# Patient Record
Sex: FEMALE | Race: WHITE | NOT HISPANIC OR LATINO
[De-identification: names, ages, dates, MRNs, and addresses within clinical notes are randomized per-mention and may not be internally consistent; named-entity substitution may affect disease eponyms.]

---

## 2017-09-04 ENCOUNTER — TRANSCRIPTION ENCOUNTER (OUTPATIENT)
Age: 37
End: 2017-09-04

## 2017-09-28 ENCOUNTER — APPOINTMENT (OUTPATIENT)
Dept: HUMAN REPRODUCTION | Facility: CLINIC | Age: 37
End: 2017-09-28

## 2019-08-12 ENCOUNTER — FORM ENCOUNTER (OUTPATIENT)
Age: 39
End: 2019-08-12

## 2020-05-08 ENCOUNTER — MESSAGE (OUTPATIENT)
Age: 40
End: 2020-05-08

## 2020-05-08 ENCOUNTER — APPOINTMENT (OUTPATIENT)
Dept: DISASTER EMERGENCY | Facility: CLINIC | Age: 40
End: 2020-05-08

## 2020-05-19 ENCOUNTER — APPOINTMENT (OUTPATIENT)
Dept: DISASTER EMERGENCY | Facility: CLINIC | Age: 40
End: 2020-05-19

## 2020-05-20 LAB
SARS-COV-2 IGG SERPL IA-ACNC: 3.2 INDEX
SARS-COV-2 IGG SERPL QL IA: POSITIVE

## 2020-05-29 ENCOUNTER — TRANSCRIPTION ENCOUNTER (OUTPATIENT)
Age: 40
End: 2020-05-29

## 2020-10-01 ENCOUNTER — APPOINTMENT (OUTPATIENT)
Dept: DERMATOLOGY | Facility: CLINIC | Age: 40
End: 2020-10-01
Payer: COMMERCIAL

## 2020-10-01 DIAGNOSIS — Z80.8 FAMILY HISTORY OF MALIGNANT NEOPLASM OF OTHER ORGANS OR SYSTEMS: ICD-10-CM

## 2020-10-01 PROCEDURE — 99203 OFFICE O/P NEW LOW 30 MIN: CPT

## 2020-10-01 RX ORDER — HYDROCORTISONE 1 %
12 CREAM (GRAM) TOPICAL
Qty: 1 | Refills: 3 | Status: ACTIVE | COMMUNITY
Start: 2020-10-01 | End: 1900-01-01

## 2020-10-01 RX ORDER — FLUOXETINE HYDROCHLORIDE 40 MG/1
CAPSULE ORAL
Refills: 0 | Status: ACTIVE | COMMUNITY

## 2020-10-01 NOTE — PHYSICAL EXAM
[Alert] : alert [Oriented x 3] : ~L oriented x 3 [Well Nourished] : well nourished [FreeTextEntry3] : The following areas were examined and no significant abnormalities were seen except as noted below:\par \par Type II skin\par \par scalp, face, eyelids, nose, lips, ears, neck, chest, abdomen, back, buttocks, right arm, left arm, right hand, left hand,\par right  leg, left leg, right foot, left foot\par Breast and groin exams offered and declined by patient.\par \par Right angle of jaw: 5 x 5 mm tan verrucous papule\par Multiple 2 mm tan macules present in a photo distribution\par Lower mid chest and under breasts: Symmetric reticulated tan slightly verrucous plaques\par \par No suspicious lesions seen

## 2020-10-01 NOTE — HISTORY OF PRESENT ILLNESS
[FreeTextEntry1] : Evaluation of growths [de-identified] : First visit for 39-year-old white female presenting for evaluation of growths. Particularly concerned about a lesion in the right angle of the jaw. No history of skin cancer.

## 2020-10-01 NOTE — ASSESSMENT
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

## 2020-10-27 ENCOUNTER — APPOINTMENT (OUTPATIENT)
Dept: FAMILY MEDICINE | Facility: CLINIC | Age: 40
End: 2020-10-27

## 2022-06-28 ENCOUNTER — APPOINTMENT (OUTPATIENT)
Dept: DERMATOLOGY | Facility: CLINIC | Age: 42
End: 2022-06-28
Payer: COMMERCIAL

## 2022-06-28 PROCEDURE — 99213 OFFICE O/P EST LOW 20 MIN: CPT

## 2022-06-28 RX ORDER — AZITHROMYCIN 250 MG/1
250 TABLET, FILM COATED ORAL
Qty: 6 | Refills: 0 | Status: DISCONTINUED | COMMUNITY
Start: 2022-03-25

## 2022-06-28 RX ORDER — CEFUROXIME AXETIL 250 MG/1
250 TABLET ORAL
Qty: 14 | Refills: 0 | Status: DISCONTINUED | COMMUNITY
Start: 2022-05-21

## 2022-06-28 RX ORDER — FLUTICASONE PROPIONATE 0.5 MG/G
0.05 CREAM TOPICAL
Qty: 1 | Refills: 2 | Status: ACTIVE | COMMUNITY
Start: 2022-06-28 | End: 1900-01-01

## 2022-06-28 RX ORDER — PREDNISONE 10 MG/1
10 TABLET ORAL
Qty: 18 | Refills: 0 | Status: DISCONTINUED | COMMUNITY
Start: 2022-06-11

## 2022-06-28 RX ORDER — AMOXICILLIN AND CLAVULANATE POTASSIUM 875; 125 MG/1; MG/1
875-125 TABLET, COATED ORAL
Qty: 20 | Refills: 0 | Status: DISCONTINUED | COMMUNITY
Start: 2022-06-11

## 2022-06-28 RX ORDER — NIRMATRELVIR AND RITONAVIR 300-100 MG
20 X 150 MG & KIT ORAL
Qty: 30 | Refills: 0 | Status: DISCONTINUED | COMMUNITY
Start: 2022-06-22

## 2022-06-28 NOTE — HISTORY OF PRESENT ILLNESS
[FreeTextEntry1] : Evaluation of growths [de-identified] : Followup visit for 41-year-old white female last seen by me on October 1, 2020, presenting for evaluation of growths.  Also complains of a rash in the lower abdominal pannus area present for a few days. Self treated with Desitin.\par \par Note: Patient recently took a course of Paxlovid for COVID-19.

## 2022-06-28 NOTE — PHYSICAL EXAM
[Alert] : alert [Oriented x 3] : ~L oriented x 3 [Well Nourished] : well nourished [FreeTextEntry3] : The following areas were examined and no significant abnormalities were seen except as noted below:\par \par Type II skin\par \par scalp, face, eyelids, nose, lips, ears, neck, chest, abdomen, back, buttocks, right arm, left arm, right hand, left hand,\par right  leg, left leg, right foot, left foot\par Breast and groin exams offered and declined by patient.\par \par Symmetric large erythematous/hyperpigmented patches present in the lower abdominal fold extending into the inguinal folds area - mild scaling present on the upper portion of the right lower abdominal fold rash\par KOH - negative\par \par \par No suspicious lesion seen

## 2022-07-27 ENCOUNTER — APPOINTMENT (OUTPATIENT)
Dept: DERMATOLOGY | Facility: CLINIC | Age: 42
End: 2022-07-27

## 2022-07-27 PROCEDURE — 10060 I&D ABSCESS SIMPLE/SINGLE: CPT

## 2022-07-27 PROCEDURE — 99212 OFFICE O/P EST SF 10 MIN: CPT | Mod: 25

## 2022-07-27 NOTE — HISTORY OF PRESENT ILLNESS
[FreeTextEntry1] : Rash on abdomen [de-identified] : Followup visit for 41-year-old white female last seen by me on June 20 2022, (at which time she had a full skin exam) with extensive intertrigo of uncertain etiology the lower abdominal fold areas. Treated with:\par Start fluticasone cream 0.05% to affected areas once a day.  Rash has resolved\par \par Complains today of a tender "lump" in the scalp. Also concerned about a lesion on the left inner thigh.

## 2022-07-27 NOTE — PHYSICAL EXAM
[Alert] : alert [Oriented x 3] : ~L oriented x 3 [Well Nourished] : well nourished [FreeTextEntry3] : Patient wearing a facemask\par \par Left ear scalp: 18 x 18 mm freely movable cystic nodule\par Left inner thigh: 2.5 x 2.5 mm purple smooth papule

## 2022-11-30 ENCOUNTER — APPOINTMENT (OUTPATIENT)
Dept: OBGYN | Facility: CLINIC | Age: 42
End: 2022-11-30

## 2022-11-30 VITALS
HEIGHT: 66 IN | SYSTOLIC BLOOD PRESSURE: 130 MMHG | WEIGHT: 293 LBS | RESPIRATION RATE: 16 BRPM | DIASTOLIC BLOOD PRESSURE: 80 MMHG | OXYGEN SATURATION: 99 % | BODY MASS INDEX: 47.09 KG/M2 | HEART RATE: 86 BPM

## 2022-11-30 DIAGNOSIS — Z98.891 HISTORY OF UTERINE SCAR FROM PREVIOUS SURGERY: ICD-10-CM

## 2022-11-30 DIAGNOSIS — Z86.39 PERSONAL HISTORY OF OTHER ENDOCRINE, NUTRITIONAL AND METABOLIC DISEASE: ICD-10-CM

## 2022-11-30 DIAGNOSIS — Z86.59 PERSONAL HISTORY OF OTHER MENTAL AND BEHAVIORAL DISORDERS: ICD-10-CM

## 2022-11-30 DIAGNOSIS — Z86.718 PERSONAL HISTORY OF OTHER VENOUS THROMBOSIS AND EMBOLISM: ICD-10-CM

## 2022-11-30 DIAGNOSIS — G43.909 MIGRAINE, UNSPECIFIED, NOT INTRACTABLE, W/OUT STATUS MIGRAINOSUS: ICD-10-CM

## 2022-11-30 DIAGNOSIS — Z87.42 PERSONAL HISTORY OF OTHER DISEASES OF THE FEMALE GENITAL TRACT: ICD-10-CM

## 2022-11-30 DIAGNOSIS — G35 MULTIPLE SCLEROSIS: ICD-10-CM

## 2022-11-30 DIAGNOSIS — Z86.711 PERSONAL HISTORY OF PULMONARY EMBOLISM: ICD-10-CM

## 2022-11-30 DIAGNOSIS — Z72.89 OTHER PROBLEMS RELATED TO LIFESTYLE: ICD-10-CM

## 2022-11-30 LAB
BILIRUB UR QL STRIP: NORMAL
CLARITY UR: CLEAR
COLLECTION METHOD: NORMAL
GLUCOSE UR-MCNC: NORMAL
HCG UR QL: 0.2 EU/DL
HCG UR QL: NEGATIVE
HGB UR QL STRIP.AUTO: NORMAL
KETONES UR-MCNC: NORMAL
LEUKOCYTE ESTERASE UR QL STRIP: NORMAL
NITRITE UR QL STRIP: NORMAL
PH UR STRIP: 7
PROT UR STRIP-MCNC: NORMAL
QUALITY CONTROL: YES
SP GR UR STRIP: 1.01

## 2022-11-30 PROCEDURE — 76830 TRANSVAGINAL US NON-OB: CPT

## 2022-11-30 PROCEDURE — 99386 PREV VISIT NEW AGE 40-64: CPT | Mod: 25

## 2022-11-30 PROCEDURE — 76857 US EXAM PELVIC LIMITED: CPT

## 2022-11-30 RX ORDER — RIZATRIPTAN BENZOATE 10 MG/1
10 TABLET ORAL
Qty: 12 | Refills: 0 | Status: ACTIVE | COMMUNITY
Start: 2022-06-14

## 2022-12-01 LAB — HPV HIGH+LOW RISK DNA PNL CVX: NOT DETECTED

## 2022-12-02 NOTE — PROCEDURE
[Cervical Pap Smear] : cervical Pap smear [Tolerated Well] : the patient tolerated the procedure well [No Complications] : there were no complications [Abnormal Uterine Bleeding] : abnormal uterine bleeding [Transvaginal Ultrasound] : transvaginal ultrasound

## 2022-12-02 NOTE — HISTORY OF PRESENT ILLNESS
[Y] : Positive pregnancy history [No] : none [Currently Active] : currently active [Men] : men [Yes] : Yes [Condoms] : uses condoms [Frequency: Q ___ days] : menstrual periods occur approximately every [unfilled] days [TextBox_19] : unknown [TextBox_25] : unknown [PapSmeardate] : 10/2017 [TextBox_31] : 1 [TextBox_37] : unknown [TextBox_43] : unknown [TextBox_78] : no hx [LMPDate] : 8/26/22 [MensesFreq] : 30 [MensesLength] : 5 [PGHxTotal] : 2 [[facility] InstitutexFulerm] : 1 [PGHxPremature] : 0 [PGHxAbortions] : 1 [[facility] Center[facility]] : 1 [PGHxABInduced] : 0 [PGHxABSpont] : 1 [PGHxEctopic] : 0 [PGHxMultBirths] : 0 [TextBox_6] : 8/26/22 [TextBox_9] : 10 [FreeTextEntry1] : 8/26/22

## 2022-12-02 NOTE — PHYSICAL EXAM
[Chaperone Present] : A chaperone was present in the examining room during all aspects of the physical examination [Appropriately responsive] : appropriately responsive [Alert] : alert [No Acute Distress] : no acute distress [Soft] : soft [Non-tender] : non-tender [Non-distended] : non-distended [No HSM] : No HSM [No Lesions] : no lesions [No Mass] : no mass [Oriented x3] : oriented x3 [Examination Of The Breasts] : a normal appearance [No Masses] : no breast masses were palpable [Labia Majora] : normal [Labia Minora] : normal [Normal] : normal [Old Blood] : old blood was present in the vagina [FreeTextEntry6] : unable to determine due to body habitus

## 2022-12-02 NOTE — PLAN
[FreeTextEntry1] : Encounter for GYN exam \par \par - PAP obtained \par - Mammogram ordered \par \par Abnormal uterine bleeding \par \par - US ordered \par - Pt. to follow up in 3 weeks for further determination in plan of care. \par \par All questions and concerns addressed during encounter. Pt. agreed to plan of care.

## 2022-12-05 ENCOUNTER — NON-APPOINTMENT (OUTPATIENT)
Age: 42
End: 2022-12-05

## 2022-12-12 LAB — CYTOLOGY CVX/VAG DOC THIN PREP: NORMAL

## 2022-12-16 ENCOUNTER — APPOINTMENT (OUTPATIENT)
Dept: OTOLARYNGOLOGY | Facility: CLINIC | Age: 42
End: 2022-12-16

## 2023-01-09 ENCOUNTER — APPOINTMENT (OUTPATIENT)
Dept: OBGYN | Facility: CLINIC | Age: 43
End: 2023-01-09
Payer: COMMERCIAL

## 2023-01-09 VITALS
RESPIRATION RATE: 16 BRPM | HEIGHT: 66 IN | WEIGHT: 293 LBS | BODY MASS INDEX: 47.09 KG/M2 | SYSTOLIC BLOOD PRESSURE: 132 MMHG | HEART RATE: 82 BPM | OXYGEN SATURATION: 98 % | DIASTOLIC BLOOD PRESSURE: 86 MMHG

## 2023-01-09 LAB
BILIRUB UR QL STRIP: NORMAL
CLARITY UR: CLEAR
COLLECTION METHOD: NORMAL
GLUCOSE UR-MCNC: NORMAL
HCG UR QL: 0.2 EU/DL
HCG UR QL: NEGATIVE
HGB UR QL STRIP.AUTO: NORMAL
KETONES UR-MCNC: NORMAL
LEUKOCYTE ESTERASE UR QL STRIP: NORMAL
NITRITE UR QL STRIP: NORMAL
PH UR STRIP: 7.5
PROT UR STRIP-MCNC: NORMAL
QUALITY CONTROL: YES
SP GR UR STRIP: 1.01

## 2023-01-09 PROCEDURE — 58558Z: CUSTOM

## 2023-01-09 PROCEDURE — 81003 URINALYSIS AUTO W/O SCOPE: CPT | Mod: QW

## 2023-01-09 PROCEDURE — 81025 URINE PREGNANCY TEST: CPT

## 2023-01-10 NOTE — ASSESSMENT
[FreeTextEntry1] : PT presents today for an endometrial biopsy and office hysteroscopy for a thickened endometrium nand persistent bleeding for 6 months. The risks benefits and alternatives to sampling of endometrium and endosee was discusses and all questions were answered. A consent prior to the procedure was signed.\par

## 2023-01-10 NOTE — PROCEDURE
[Endometrial Biopsy] : Endometrial biopsy [Thickened Endometrium] : thickened endometrium [___ mL Injected] : [unfilled] ~UmL of lidocaine [0.02] : 0.02 [Without Epi] : without epinephrine [Hysteroscopy] : Hysteroscopy [Time out performed] : Pre-procedure time out performed.  Patient's name, date of birth and procedure confirmed. [Consent Obtained] : Consent obtained [Abnormal uterine bleeding] : abnormal uterine bleeding [Risks] : risks [Benefits] : benefits [Alternatives] : alternatives [Patient] : patient [Infection] : infection [Bleeding] : bleeding [Allergic Reaction] : allergic reaction [Lidocaine___ mL] : [unfilled] ~UmL of lidocaine [flexible] : Using aseptic technique a hysteroscopy was performed using a flexible hysteroscope [Sent to Pathology] : specimen was placed in buffered formalin and sent for pathology [Hemostasis obtained] : hemostasis obtained [Tolerated Well] : Patient tolerated the procedure well [Aftercare instructions/regstrictions given and follow-up scheduled] : Aftercare instructions/restrictions given and follow-up scheduled [Antibiotics given] : antibiotics not given [de-identified] : Normal appearing cavity Small fibroid.

## 2023-01-13 ENCOUNTER — NON-APPOINTMENT (OUTPATIENT)
Age: 43
End: 2023-01-13

## 2023-01-13 ENCOUNTER — APPOINTMENT (OUTPATIENT)
Dept: ORTHOPEDIC SURGERY | Facility: CLINIC | Age: 43
End: 2023-01-13
Payer: COMMERCIAL

## 2023-01-13 VITALS
BODY MASS INDEX: 47.09 KG/M2 | DIASTOLIC BLOOD PRESSURE: 86 MMHG | HEIGHT: 66 IN | SYSTOLIC BLOOD PRESSURE: 132 MMHG | WEIGHT: 293 LBS | HEART RATE: 82 BPM

## 2023-01-13 PROCEDURE — 73630 X-RAY EXAM OF FOOT: CPT | Mod: 50

## 2023-01-13 PROCEDURE — 99204 OFFICE O/P NEW MOD 45 MIN: CPT

## 2023-01-13 NOTE — HISTORY OF PRESENT ILLNESS
[FreeTextEntry1] : 42 year old female presenting with bilateral foot pain. The patient states in 2017 she received a left foot hairline fracture s/p injury, and was placed in a boot for 4 months. She currently has right greater than left foot pain, that has been progressing. She describes her foot pain as a "pressure from abnormal bending", mostly in the dorsal foot. She denies any trauma or injury to the right foot or ankle. She tried custom orthotics previously, which increased her pain. The patient has MS, on Gilenya daily. States since post partum in 04/2018, she has noticed significant decline in overall bodily functionality. Concerned of her currently ability to exercise, since walking was her main method of exercise. She reports overall decreased activity since foot pain began.  She is currently taking no pain medication. She is wearing sneakers and ambulating without assistive devices. No other complaints at this time. \par

## 2023-01-13 NOTE — DISCUSSION/SUMMARY
[de-identified] : Today I had a lengthy discussion with the patient regarding their bilateral foot pain. I have addressed all the patient's concerns surrounding the pathology of their condition.  XR imaging was completed in office today and results were reviewed with the patient. Previous MRI of the right foot was reviewed today. In order to provide the patient with a better understanding of their ailment, I educated them about the anatomy, physiology and lifespan of their condition using a foot model. I advised the patient to utilize gel cup inserts in the heels of their shoes, as well as an OTC dorsal hybrid night splint to facilitate stretching in the evening. At this time I would like to obtain advanced imaging of the patient's B/L foot. An MRI was ordered so I can find out more about the etiology of the patient's condition. The patient should follow up with the office after obtaining the MRI. \par The patient understood and verbally agreed to the treatment plan. All of their questions were answered and they were satisfied with the visit. The patient should call the office if they have any questions or experience worsening symptoms.

## 2023-01-13 NOTE — PHYSICAL EXAM
[de-identified] : General: Alert and oriented x3. In no acute distress. Pleasant in nature with a normal affect. No apparent respiratory distress.\par Left Foot Exam\par Skin: Clean, dry, intact\par Inspection: No obvious malalignment, no masses, no swelling, no effusion\par Pulses: 2+ DP/PT pulses\par ROM: FOOT Full  ROM of digits, ANKLE 10 degrees of dorsiflexion, 40 degrees of plantarflexion, 10 degrees of subtalar motion.\par Painful ROM: None\par Tenderness: No tenderness over the medial malleolus, No tenderness over the lateral malleolus, no CFL/ATFL/PTFL pain, no deltoid ligament pain. No heel pain. No Achilles tenderness. No 5th metatarsal pain. No pain to the LisFranc joint. No ttp over the posterior tibial tendon.\par Stability: Negative anterior/posterior drawer.\par Strength: 5/5 ADD/ABD/TA/GS/EHL/FHL/EDL\par Neuro: Sensation in tact to light touch throughout\par Additional tests: Negative Mortons test, negative tarsal tunnel tinels, negative single heel rise. \par \par Right Foot Exam\par Skin: Clean, dry, intact\par Inspection: No obvious malalignment, no masses, no swelling, no effusion\par Pulses: 2+ DP/PT pulses\par ROM: FOOT Full  ROM of digits, ANKLE 10 degrees of dorsiflexion, 40 degrees of plantarflexion, 10 degrees of subtalar motion.\par Painful ROM: None\par Tenderness: Distal MTP tenderness. No tenderness over the medial malleolus, No tenderness over the lateral malleolus, no CFL/ATFL/PTFL pain, no deltoid ligament pain. No heel pain. No Achilles tenderness. No 5th metatarsal pain. No pain to the LisFranc joint. No ttp over the posterior tibial tendon.\par Stability: Negative anterior/posterior drawer.\par Strength: 5/5 ADD/ABD/TA/GS/EHL/FHL/EDL\par Neuro: Sensation in tact to light touch throughout\par Additional tests: Negative Mortons test, negative tarsal tunnel tinels, negative single heel rise. \par  [de-identified] : 3V of the left foot were ordered obtained and reviewed by me today, 01/13/2023, revealed: Heel spur. No acute fractures or dislocations. \par \par 3V of the right foot were ordered obtained and reviewed by me today, 01/13/2023, revealed: Heel spur. No acute fractures or dislocations. \par \par \par MRI of right obtained from outside facility on 06//02/2022 and reviewed in the office today, 01/13/2023, revealed: \par \par MRI-RIGHT FOOT NON CONTRAST\par \par HISTORY: Dorsal foot pain and swelling. M79.671\par \par TECHNIQUE: MR imaging of the right foot was performed without IV contrast on a\par 3.0 [employer] Ultra High Field Wide Bore MRI unit utilizing the following pulse\par sequences: Short axis proton density with and without fat suppression, long axis\par STIR, and sagittal proton density.\par \par COMPARISON: Correlation is made with the ultrasound of 5/13/2022.\par \par FINDINGS: The right foot is imaged from the level the talonavicular joint to the\par proximal phalanges.\par \par Bones/joints: Mild degenerative change is identified at the bases of the second\par and third metatarsals. Hallux-sesamoid degenerative change is seen with mild\par fibular sesamoid edema. T2 signal hyperintensity at the plantar aspect of the\par first metatarsal neck likely delaminates from the metatarsophalangeal joints.\par There is no marrow or periosseous edema about the metatarsal shafts to suggest\par the presence of an acute stress injury. No advanced osteoarthritis or\par periarticular erosive change is seen. No acutely displaced fracture or\par dislocation is seen. A small talonavicular joint effusion is present.\par \par Tendons/muscles: No full-thickness tendon tear or tenosynovitis is appreciated.\par There is no disproportionate muscle atrophy or intramuscular edema.\par \par Ligaments: The Lisfranc ligament is intact. There is no acute full-thickness\par plantar plate tear.\par \par Webspaces: Mild scarring is noted within the second webspace. There is mild\par first and trace second and third intermetatarsal bursitis.\par \par Miscellaneous: Dorsal foot subcutaneous edema is nonspecific.\par \par IMPRESSION:\par \par \par Nonspecific dorsal forefoot subcutaneous edema. Mild first-third intermetatarsal\par bursitis with mild second webspace scarring. Mild degenerative changes, as\par above.\par \par Signed by: Jaida Donovan MD\par Signed Date: 6/2/2022 5:18 PM EDT\par \par \par \par SIGNED BY: Jaida Donovan M.D., Ext. [phone] 06/02/2022 05:18 PM\par \par

## 2023-01-13 NOTE — ADDENDUM
[FreeTextEntry1] : I, Gabby Ariza, acted solely as a scribe for Dr. Josue Iglesias on this date 01/13/2023 .\par  \par All medical record entries made by the Scribe were at my, Dr. Josue Iglesias, direction and personally dictated by me on 01/13/2023 . I have reviewed the chart and agree that the record accurately reflects my personal performance of the history, physical exam, assessment and plan. I have also personally directed, reviewed, and agreed with the chart.

## 2023-01-17 ENCOUNTER — NON-APPOINTMENT (OUTPATIENT)
Age: 43
End: 2023-01-17

## 2023-01-18 LAB — CORE LAB BIOPSY: NORMAL

## 2023-01-25 ENCOUNTER — RESULT REVIEW (OUTPATIENT)
Age: 43
End: 2023-01-25

## 2023-01-25 ENCOUNTER — APPOINTMENT (OUTPATIENT)
Dept: MRI IMAGING | Facility: CLINIC | Age: 43
End: 2023-01-25
Payer: COMMERCIAL

## 2023-01-25 ENCOUNTER — OUTPATIENT (OUTPATIENT)
Dept: OUTPATIENT SERVICES | Facility: HOSPITAL | Age: 43
LOS: 1 days | End: 2023-01-25
Payer: COMMERCIAL

## 2023-01-25 DIAGNOSIS — M77.42 METATARSALGIA, LEFT FOOT: ICD-10-CM

## 2023-01-25 DIAGNOSIS — M77.41 METATARSALGIA, RIGHT FOOT: ICD-10-CM

## 2023-01-25 DIAGNOSIS — M79.671 PAIN IN RIGHT FOOT: ICD-10-CM

## 2023-01-25 PROCEDURE — 73718 MRI LOWER EXTREMITY W/O DYE: CPT | Mod: 26,RT,76

## 2023-01-25 PROCEDURE — 73718 MRI LOWER EXTREMITY W/O DYE: CPT | Mod: 26,LT

## 2023-01-25 PROCEDURE — 73718 MRI LOWER EXTREMITY W/O DYE: CPT

## 2023-02-01 ENCOUNTER — APPOINTMENT (OUTPATIENT)
Dept: ORTHOPEDIC SURGERY | Facility: CLINIC | Age: 43
End: 2023-02-01
Payer: COMMERCIAL

## 2023-02-01 PROCEDURE — 99214 OFFICE O/P EST MOD 30 MIN: CPT

## 2023-02-01 NOTE — PHYSICAL EXAM
[de-identified] : General: Alert and oriented x3. In no acute distress. Pleasant in nature with a normal affect. No apparent respiratory distress.\par Left Foot Exam\par Skin: Clean, dry, intact\par Inspection: No obvious malalignment, no masses, no swelling, no effusion\par Pulses: 2+ DP/PT pulses\par ROM: FOOT Full  ROM of digits, ANKLE 10 degrees of dorsiflexion, 40 degrees of plantarflexion, 10 degrees of subtalar motion.\par Painful ROM: None\par Tenderness: No tenderness over the medial malleolus, No tenderness over the lateral malleolus, no CFL/ATFL/PTFL pain, no deltoid ligament pain. No heel pain. No Achilles tenderness. No 5th metatarsal pain. No pain to the LisFranc joint. No ttp over the posterior tibial tendon.\par Stability: Negative anterior/posterior drawer.\par Strength: 5/5 ADD/ABD/TA/GS/EHL/FHL/EDL\par Neuro: Sensation in tact to light touch throughout\par Additional tests: Negative Mortons test, negative tarsal tunnel tinels, negative single heel rise. \par \par Right Foot Exam\par Skin: Clean, dry, intact\par Inspection: No obvious malalignment, no masses, no swelling, no effusion\par Pulses: 2+ DP/PT pulses\par ROM: FOOT Full  ROM of digits, ANKLE 10 degrees of dorsiflexion, 40 degrees of plantarflexion, 10 degrees of subtalar motion.\par Painful ROM: None\par Tenderness: Distal MTP tenderness. No tenderness over the medial malleolus, No tenderness over the lateral malleolus, no CFL/ATFL/PTFL pain, no deltoid ligament pain. No heel pain. No Achilles tenderness. No 5th metatarsal pain. No pain to the LisFranc joint. No ttp over the posterior tibial tendon.\par Stability: Negative anterior/posterior drawer.\par Strength: 5/5 ADD/ABD/TA/GS/EHL/FHL/EDL\par Neuro: Sensation in tact to light touch throughout\par Additional tests: Negative Mortons test, negative tarsal tunnel tinels, negative single heel rise. \par  [de-identified] : EXAM: [id number] - MR FOOT RT - ORDERED BY: BERRY REICH\par \par \par PROCEDURE DATE: 01/25/2023\par \par \par \par INTERPRETATION: RIGHT FOOT MRI\par \par CLINICAL INDICATION: Chronic bilateral dorsal foot pain and swelling. No prior surgery.\par \par COMPARISON: None available.\par \par TECHNIQUE: Multiplanar, multisequence MRI was obtained of the right forefoot.\par \par FINDINGS:\par \par OSSEOUS: No acute fracture, osteonecrosis, or aggressive neoplasm. Mild to moderate tarsometatarsal arthrosis, most advanced at the second TMT including small subchondral fibrocystic change with mild surrounding osteitis along the plantar margin of the second metatarsal base. Background healed remote minimally impacted Lisfranc fracture injury but without widening of the interval or other convincing evidence for instability on this nonweightbearing examination. Mild to moderate arthrosis at the first MTP including mild patchy osteitis within the fibular sided hallux sesamoid and 1.0 cm adjacent synovial cyst/ganglion along its proximal margin near the insertion of the flexor hallucis brevis, occurring on a background of mild hallux valgus. Bunionette deformity at the fifth MTP.\par \par SYNOVIUM: No effusions.\par \par TENDONS: Visualized flexor and extensor tendons are intact.\par \par LIGAMENTS: Small chronic grade 2 sprain of the Lisfranc ligament including partial-thickness tear of the dorsal plate and, but with intact fibers throughout. Chronic grade 2/3 capsular sprain injury at the first MTP including at least partial-thickness plantar plate tear. Grade subacute on grade 2 chronic capsular sprain injuries at the second through fifth MTPs.\par \par GENERAL: Nonspecific mild patchy superficial subcutaneous edema along the dorsolateral forefoot overlying the third through fifth metatarsal shafts. Subcentimeter synovial cyst/ganglion along the dorsal margin of the talonavicular joint. No hematoma or drainable encapsulated fluid collection. Trace reactive fluid within the first and third intermetatarsal bursa without fito bursitis. Perineural fibrosis within the second interdigital webspace without discrete sizable neuroma. Chronic grade 2 strain of the abductor digiti minimi.\par \par IMPRESSION:\par \par 1. Mild to moderate tarsometatarsal arthrosis most advanced at right second TMT, occurring on a background of healed remote minimally impacted Lisfranc fracture injury.\par 2. Mild to moderate arthrosis and mild hallux valgus at right first MTP with mild sesamoiditis versus subacute contusion of the fibular sided hallux sesamoid and small adjacent synovial cyst/ganglion along its proximal margin.\par \par --- End of Report ---\par \par \par \par \par \par \par FAROOQ MCCALL MD; Attending Radiologist\par This document has been electronically signed. Jan 27 2023 11:28AM\par \par EXAM: [id number] - MR FOOT LT - ORDERED BY: BERRY REICH\par \par \par PROCEDURE DATE: 01/25/2023\par \par \par \par INTERPRETATION: LEFT FOOT MRI\par \par CLINICAL INDICATION: Chronic bilateral dorsal foot pain and swelling.\par \par COMPARISON: None available.\par \par TECHNIQUE: Multiplanar, multisequence MRI was obtained of the left foot.\par \par FINDINGS:\par \par OSSEOUS: No acute fracture, osteonecrosis, or aggressive neoplasm. Lisfranc interval is not widened on this non-weightbearing examination. Mild tarsometatarsal arthrosis. Mild to moderate midfoot arthrosis, most advanced at the medial-middle intercuneiform joint with multiloculated synovial cyst/ganglion along its dorsal proximal margin measuring 1.5 cm in greatest dimension, with local mass effect resulting in subtle displacement of adjacent dorsalis pedis neurovascular bundle. Mild arthrosis and mild hallux valgus at the first MTP. Suspect pes planus, incompletely evaluated.\par \par SYNOVIUM: No effusions.\par \par TENDONS: Visualized flexor and extensor tendons are intact. Subcentimeter synovial/tenosynovial cyst surrounding the common flexor tendon tendon of the second ray at the level of the neck of the proximal phalanx. Mild tibialis anterior insertional tendinitis without tear.\par \par LIGAMENTS: Lisfranc ligament is intact. Chronic grade 2 capsular sprain injury at the first MTP. Grade 1 subacute on grade 2 chronic capsular sprain injuries at the second through fifth MCPs.\par \par GENERAL: Nonspecific mild patchy dorsolateral subcutaneous soft tissue edema like signal along overlying the third through fifth metatarsals. No hematoma or drainable encapsulated fluid collection. Additional subcentimeter synovial cyst/ganglion along the medial margin of the naviculocuneiform joint. Small volume fluid within the first through third intermetatarsal bursae with mild peribursal edema. No interdigital neuroma. Small chronic grade 2 strain of the abductor digiti minimi.\par \par IMPRESSION:\par \par 1. Mild to moderate left midfoot arthrosis, most advanced at the medial-middle intercuneiform joint with 1.5 cm synovial cyst/ganglion along its dorsal proximal margin resulting in mild mass effect upon the dorsalis pedis neurovascular bundle. Correlate for potential lateral femoral branch deep peroneal neuropathy.\par 2. Mild left tibialis anterior insertional tendinitis without tear.\par 3. Possible mild left first through third intermetatarsal bursitis.\par 4. Suspect background pes planus. Consider follow-up weightbearing radiographs.\par 5. Additional lower grade and chronic findings as detailed in body section of this report.\par \par --- End of Report ---\par \par \par \par \par \par \par FAROOQ MCCALL MD; Attending Radiologist\par This document has been electronically signed. Jan 27 2023 11:42AM

## 2023-02-01 NOTE — HISTORY OF PRESENT ILLNESS
[FreeTextEntry1] : 2/1/2023: The patient is a 43 yo female presenting for a MRI review of bilateral foot pain. The patient states that her symptoms are unchanged since her previous visit. She presents today wearing sneakers and is ambulating without assistance.\par \par 1/13/2023: 42 year old female presenting with bilateral foot pain. The patient states in 2017 she received a left foot hairline fracture s/p injury, and was placed in a boot for 4 months. She currently has right greater than left foot pain, that has been progressing. She describes her foot pain as a "pressure from abnormal bending", mostly in the dorsal foot. She denies any trauma or injury to the right foot or ankle. She tried custom orthotics previously, which increased her pain. The patient has MS, on Gilenya daily. States since post partum in 04/2018, she has noticed significant decline in overall bodily functionality. Concerned of her currently ability to exercise, since walking was her main method of exercise. She reports overall decreased activity since foot pain began.  She is currently taking no pain medication. She is wearing sneakers and ambulating without assistive devices. No other complaints at this time. \par

## 2023-02-01 NOTE — DISCUSSION/SUMMARY
[de-identified] : Today I had a lengthy discussion with the patient regarding their bilateral pain.I have addressed all the patient's concerns surrounding the pathology of their condition. I have reviewed the MRI with the patient in great detail. A discussion was had about shoe-wear modifications. I advised the patient to utilize a wide toed cross training sneaker that better accommodates the feet. I recommended New Balance, Marie, or [employer] to the patient. I have discussed that light exercise, such as walking, swimming, elliptical and cycling along with weight loss will help alleviate her symptoms. I recommend that the patient utilize meloxicam with food once per day as instructed. A prescription for the meloxicam was ordered for the patient in the office today. I recommend that the patient utilize ice, NSAIDS PRN, and heat. The patient understood and verbally agreed to the treatment plan. All of their questions were answered and they were satisfied with the visit. The patient should call the office if they have any questions or experience worsening symptoms.\par \par I would like to see the patient back in the office in 2-3 months to reassess their condition.\par

## 2023-02-21 ENCOUNTER — OUTPATIENT (OUTPATIENT)
Dept: OUTPATIENT SERVICES | Facility: HOSPITAL | Age: 43
LOS: 1 days | End: 2023-02-21
Payer: COMMERCIAL

## 2023-02-21 VITALS
HEIGHT: 66 IN | HEART RATE: 73 BPM | WEIGHT: 293 LBS | TEMPERATURE: 98 F | DIASTOLIC BLOOD PRESSURE: 89 MMHG | RESPIRATION RATE: 14 BRPM | SYSTOLIC BLOOD PRESSURE: 157 MMHG | OXYGEN SATURATION: 98 %

## 2023-02-21 DIAGNOSIS — E66.9 OBESITY, UNSPECIFIED: ICD-10-CM

## 2023-02-21 DIAGNOSIS — N93.9 ABNORMAL UTERINE AND VAGINAL BLEEDING, UNSPECIFIED: ICD-10-CM

## 2023-02-21 DIAGNOSIS — D25.9 LEIOMYOMA OF UTERUS, UNSPECIFIED: ICD-10-CM

## 2023-02-21 DIAGNOSIS — Z90.89 ACQUIRED ABSENCE OF OTHER ORGANS: Chronic | ICD-10-CM

## 2023-02-21 DIAGNOSIS — Z98.890 OTHER SPECIFIED POSTPROCEDURAL STATES: Chronic | ICD-10-CM

## 2023-02-21 DIAGNOSIS — Z96.22 MYRINGOTOMY TUBE(S) STATUS: Chronic | ICD-10-CM

## 2023-02-21 DIAGNOSIS — Z01.818 ENCOUNTER FOR OTHER PREPROCEDURAL EXAMINATION: ICD-10-CM

## 2023-02-21 DIAGNOSIS — R93.89 ABNORMAL FINDINGS ON DIAGNOSTIC IMAGING OF OTHER SPECIFIED BODY STRUCTURES: ICD-10-CM

## 2023-02-21 DIAGNOSIS — Z98.891 HISTORY OF UTERINE SCAR FROM PREVIOUS SURGERY: Chronic | ICD-10-CM

## 2023-02-21 LAB
ALBUMIN SERPL ELPH-MCNC: 3.8 G/DL — SIGNIFICANT CHANGE UP (ref 3.3–5)
ALP SERPL-CCNC: 60 U/L — SIGNIFICANT CHANGE UP (ref 40–120)
ALT FLD-CCNC: 32 U/L — SIGNIFICANT CHANGE UP (ref 12–78)
ANION GAP SERPL CALC-SCNC: 4 MMOL/L — LOW (ref 5–17)
AST SERPL-CCNC: 30 U/L — SIGNIFICANT CHANGE UP (ref 15–37)
BILIRUB SERPL-MCNC: 0.3 MG/DL — SIGNIFICANT CHANGE UP (ref 0.2–1.2)
BUN SERPL-MCNC: 14 MG/DL — SIGNIFICANT CHANGE UP (ref 7–23)
CALCIUM SERPL-MCNC: 9.2 MG/DL — SIGNIFICANT CHANGE UP (ref 8.5–10.1)
CHLORIDE SERPL-SCNC: 110 MMOL/L — HIGH (ref 96–108)
CO2 SERPL-SCNC: 26 MMOL/L — SIGNIFICANT CHANGE UP (ref 22–31)
CREAT SERPL-MCNC: 0.81 MG/DL — SIGNIFICANT CHANGE UP (ref 0.5–1.3)
EGFR: 94 ML/MIN/1.73M2 — SIGNIFICANT CHANGE UP
GLUCOSE SERPL-MCNC: 97 MG/DL — SIGNIFICANT CHANGE UP (ref 70–99)
HCG SERPL-ACNC: <1 MIU/ML — SIGNIFICANT CHANGE UP
HCT VFR BLD CALC: 37.9 % — SIGNIFICANT CHANGE UP (ref 34.5–45)
HGB BLD-MCNC: 12.4 G/DL — SIGNIFICANT CHANGE UP (ref 11.5–15.5)
MCHC RBC-ENTMCNC: 29 PG — SIGNIFICANT CHANGE UP (ref 27–34)
MCHC RBC-ENTMCNC: 32.7 GM/DL — SIGNIFICANT CHANGE UP (ref 32–36)
MCV RBC AUTO: 88.8 FL — SIGNIFICANT CHANGE UP (ref 80–100)
NRBC # BLD: 0 /100 WBCS — SIGNIFICANT CHANGE UP (ref 0–0)
PLATELET # BLD AUTO: 296 K/UL — SIGNIFICANT CHANGE UP (ref 150–400)
POTASSIUM SERPL-MCNC: 4.1 MMOL/L — SIGNIFICANT CHANGE UP (ref 3.5–5.3)
POTASSIUM SERPL-SCNC: 4.1 MMOL/L — SIGNIFICANT CHANGE UP (ref 3.5–5.3)
PROT SERPL-MCNC: 7.7 G/DL — SIGNIFICANT CHANGE UP (ref 6–8.3)
RBC # BLD: 4.27 M/UL — SIGNIFICANT CHANGE UP (ref 3.8–5.2)
RBC # FLD: 12.5 % — SIGNIFICANT CHANGE UP (ref 10.3–14.5)
SODIUM SERPL-SCNC: 140 MMOL/L — SIGNIFICANT CHANGE UP (ref 135–145)
WBC # BLD: 7.39 K/UL — SIGNIFICANT CHANGE UP (ref 3.8–10.5)
WBC # FLD AUTO: 7.39 K/UL — SIGNIFICANT CHANGE UP (ref 3.8–10.5)

## 2023-02-21 PROCEDURE — 86850 RBC ANTIBODY SCREEN: CPT

## 2023-02-21 PROCEDURE — 36415 COLL VENOUS BLD VENIPUNCTURE: CPT

## 2023-02-21 PROCEDURE — 86900 BLOOD TYPING SEROLOGIC ABO: CPT

## 2023-02-21 PROCEDURE — 85027 COMPLETE CBC AUTOMATED: CPT

## 2023-02-21 PROCEDURE — 80053 COMPREHEN METABOLIC PANEL: CPT

## 2023-02-21 PROCEDURE — 84702 CHORIONIC GONADOTROPIN TEST: CPT

## 2023-02-21 PROCEDURE — 93005 ELECTROCARDIOGRAM TRACING: CPT

## 2023-02-21 PROCEDURE — 86901 BLOOD TYPING SEROLOGIC RH(D): CPT

## 2023-02-21 PROCEDURE — 93010 ELECTROCARDIOGRAM REPORT: CPT

## 2023-02-21 PROCEDURE — G0463: CPT

## 2023-02-21 NOTE — H&P PST ADULT - NSICDXFAMILYHX_GEN_ALL_CORE_FT
FAMILY HISTORY:  Father  Still living? Yes, Estimated age: 71-80  Family history of hypercholesterolemia, Age at diagnosis: Age Unknown  Family history of hypertension, Age at diagnosis: Age Unknown  Family history of type 2 diabetes mellitus in father, Age at diagnosis: Age Unknown    Mother  Still living? Yes, Estimated age: 61-70  Family history of hypercholesterolemia, Age at diagnosis: Age Unknown  FH: arthritis, Age at diagnosis: Age Unknown

## 2023-02-21 NOTE — H&P PST ADULT - NEGATIVE GENERAL SYMPTOMS
Notes prior H/O COVID X 2 (2020 then again June 2022) - has had 4 doses of Pfizer vaccine/no fever/no chills/no sweating

## 2023-02-21 NOTE — H&P PST ADULT - ASSESSMENT
42 year old female  (Missed AB X 1),  PMH Hypercholesterolemia, Multiple Sclerosis (DX 2007), DVT/PE (2012),Anxiety/Depression, Migraines, Acid Reflux, Asthma, Hay Fever, Arthritis, COVID-19 X 2 ( then again 2022); now with Leiomyoma of Uterus Unspecified, Abnormal Uterine and vaginal Bleeding Unspecified, Abnormal Findings on Diagnostic Imaging of Other Specified Body Structures; scheduled for Hysteroscopy W/Myosure with Dr Omid Pan on 3/2/2023.

## 2023-02-21 NOTE — H&P PST ADULT - NEUROLOGICAL COMMENTS
Notes Fatigue due to her MS as well as intermittent muscle aches and joint pain - follow with neuro every 3 months

## 2023-02-21 NOTE — H&P PST ADULT - NSICDXPASTSURGICALHX_GEN_ALL_CORE_FT
2022        Bryce Quinterosemerald Caio     : 2011  [address] [address]        To Whom It May Concern,    This is to certify that Bryce was seen in the clinic on 2022. Please excuse Bryce from school for this appointment.             SIGNATURE:_________________________________________, 2022           Wanda Vazquez MD                                .       [facility]  [address]  [place] [address]  Dept Phone: [phone]  
PAST SURGICAL HISTORY:  H/O  section     H/O ovarian cystectomy     History of placement of ear tubes     History of tonsillectomy     S/P ORIF (open reduction internal fixation) fracture

## 2023-02-21 NOTE — H&P PST ADULT - HISTORY OF PRESENT ILLNESS
42 year old female  (Missed AB X 1),  PMH Hypercholesterolemia, Multiple Sclerosis (DX 2007), DVT/PE (2012),Anxiety/Depression, Migraines, Acid Reflux, Asthma, Hay Fever, Arthritis, COVID-19 X 2 ( then again 2022); now with Leiomyoma of Uterus Unspecified, Abnormal Uterine and vaginal Bleeding Unspecified, Abnormal Findings on Diagnostic Imaging of Other Specified Body Structures; presents today for PST prior to hysteroscopy W/Myosure with Dr Omid Pan on 3/2/2023.     Pt notes she "never had true period after August then I would bleed lighter some days heavier other days." Pt notes she was seen at GYN had US which showed "thickened lining and I had a BX." pt notes "BX was negative but I was DX with Fibroids." Following discussions with Dr Pan regarding treatment options pt is electing for scheduled procedure.

## 2023-02-21 NOTE — H&P PST ADULT - PATIENT OPTIMIZED
Will fax over PST results to PCP for review and medical clearance - Pt will also call neurologist to discuss upcoming procedure

## 2023-02-21 NOTE — H&P PST ADULT - NSICDXPASTMEDICALHX_GEN_ALL_CORE_FT
PAST MEDICAL HISTORY:  Abnormal findings on diagnostic imaging of other specified body structures     Abnormal uterine and vaginal bleeding, unspecified     Acid reflux     Anxiety with depression     Arthritis     Asthma     Deep vein thrombosis (DVT) of lower extremity     H/O multiple sclerosis     Hay fever     Hypercholesterolemia     Leiomyoma of uterus, unspecified     Migraines     Missed ab     Obesity, unspecified     Pulmonary embolism

## 2023-02-21 NOTE — H&P PST ADULT - NEGATIVE GENERAL GENITOURINARY SYMPTOMS
Addended by: HERRERA DERAS on: 9/6/2020 11:22 AM     Modules accepted: Level of Service    
no hematuria/no dysuria/no urinary hesitancy/normal urinary frequency

## 2023-02-21 NOTE — H&P PST ADULT - PROBLEM SELECTOR PLAN 1
PST labs; CBC, CMP, Type & Screen, HcG, EKG. medical clearance with PCP scheduled on Monday 2/27/23. Will call Neuro to ask for them to send last note for review. Pt will call and discuss upcoming surgical procedure with Dr Chau. Pt instructed to stop any NSAIDS/Herbal Supplements between now and procedure. May take Tylenol if needed for any pain between now and procedure. Morning of procedure she may take her Fingolimod, Wellbutrin, Prozac, Pepcid with small sip of water. Pre-op instructions given to pt with understanding verbalized. RX for COVID swab given to pt to bring with her day of swab. Appointment made for COVID swab at the [place] site on 2/25/23. pt verbalizes understanding of all instructions discussed with her today in PST. All questions addressed with pt prior to her leaving the PST department today. PST labs; CBC, CMP, Type & Screen, HcG, EKG. medical clearance with PCP scheduled on Monday 2/27/23. Will call Neuro to ask for them to send last note for review. Pt will call and discuss upcoming surgical procedure with Dr Chau. Pt instructed to stop any NSAIDS/Herbal Supplements between now and procedure. May take Tylenol if needed for any pain between now and procedure. Morning of procedure she may take her Fingolimod, Wellbutrin, Prozac, Pepcid with small sip of water. Pre-op instructions given to pt with understanding verbalized. RX for COVID swab given to pt to bring with her day of swab. Appointment made for COVID swab at the [facility] site on 2/25/23. Pt verbalizes understanding of all instructions discussed with her today in PST. All questions addressed with pt prior to her leaving the PST department today.

## 2023-02-21 NOTE — H&P PST ADULT - NSWEIGHTCALCTOOLDRUG_GEN_A_CORE
Behavioral Health Consultation  Ciera Stevenson PMHNP-BC  1/3/2022, 4:34 PM      Time spent with Patient:  45 minutes  This was a outpatient visit. Patient Location: [facility]  Provider Location: [facility]      Chief Complaint:increase in anxiety, irritability and agitation. Kenroy Walsh remarks she is feeling better this week and less anxious. She is smiling and engaging easily. Discussed what has changed and what has not. Patient reports no change in appetite but has lost 7 pounds without trying. She reports good sleep. Her mood is \"fine. \" She does note she has been thinking about what she wants for the remainder of her life and how to get it. She is thinking about where she is living and if she wants to stay. This seems to be quite a struggle for her. Ely Shoshone:  Reason for visit is medication management follow up. She has been compliant with medications. Pt reports that medications have  been working. Pt denies side effects from medications. Pt denies    hallucinations. Pt reports there has been no changes to appetite. Pt reports sleep has been better   Pt denies  current exercise. Pt denies current suicidal ideation, plan and intent. Pt  denies current homicidal ideation, plan and [email or web address]). [facility]; patient has been urinating on herself and then sitting in it, not asking for help. She has been yelling at staff and \"mean. \"    Ale Isaac was born in [facility]. She has three brothers. She was the youngest of four. She earned her MSW. She was very active within the [employer] as a . She never  nor had children. She was close to her nieces and nephews.      She felt she was getting better with DR Sara Munoz and remembered how she helped her assimilate what she wants with what is possible for her to do. Patient encouraged to work on this for [employer]. \"  Past Psychiatric history:   The patient has no prior history of mental health Expenses: Not on file   Food Insecurity:     Worried About Running Out of Food in the Last Year: Not on file    Dayne of Food in the Last Year: Not on file   Transportation Needs:     Lack of Transportation (Medical): Not on file    Lack of Transportation (Non-Medical): Not on file   Physical Activity:     Days of Exercise per Week: Not on file    Minutes of Exercise per Session: Not on file   Stress:     Feeling of Stress : Not on file   Social Connections:     Frequency of Communication with Friends and Family: Not on file    Frequency of Social Gatherings with Friends and Family: Not on file    Attends Congregational Services: Not on file    Active Member of [address] [place] [address] [employer] or Organizations: Not on file    Attends Club or Organization Meetings: Not on file    Marital Status: Not on file   Intimate Partner Violence:     Fear of Current or Ex-Partner: Not on file    Emotionally Abused: Not on file    Physically Abused: Not on file    Sexually Abused: Not on file   Housing Stability:     Unable to Pay for Housing in the Last Year: Not on file    Number of [place] in the Last Year: Not on file    Unstable Housing in the Last Year: Not on file       TOBACCO: Jorge Villarreal  reports that she is a non-smoker but has been exposed to tobacco smoke. She has been exposed to 0.25 packs per day. She has never used smokeless tobacco.  ETOH: Jorge Villarreal  reports current alcohol use. Past Medical History:   Diagnosis Date    Clostridium difficile infection 03/01/2020    Hyperlipidemia     Hypertension     Obesity     Type 2 diabetes mellitus without complication ([facility] [address]       Metabolic monitoring is being done by PCP.    Family History   Problem Relation Age of Onset    Diabetes Brother     Obesity Brother     Other Brother         TIA    Diabetes Mother     Other Mother         COPD    Heart Attack Father     Coronary Art Dis Father     Other Father         aortic aneurysm    Diabetes Brother     Cancer Brother Last Labs:   Lab Results   Component Value Date    LABA1C 5.7 12/28/2021     Lab Results   Component Value Date     12/28/2021      Lab Results   Component Value Date    WBC 10.4 12/28/2021    HGB 12.4 (L) 12/28/2021    HCT 40.1 12/28/2021    MCV 82.9 12/28/2021     (H) 12/28/2021    LYMPHOPCT 15.4 (L) 08/25/2021    RBC 4.84 12/28/2021    MCH 25.6 (L) 12/28/2021    MCHC 30.9 (L) 12/28/2021    RDW 14.6 12/28/2021          Lab Results   Component Value Date     12/28/2021    K 3.5 12/28/2021    CL 98 (L) 12/28/2021    CO2 25 12/28/2021    BUN 33 (H) 12/28/2021    CREATININE 1.3 (H) 12/28/2021    GLUCOSE 114 (H) 12/28/2021    CALCIUM 9.3 12/28/2021    PROT 6.8 12/28/2021    LABALBU 3.8 12/28/2021    BILITOT 0.2 12/28/2021    ALKPHOS 140 (H) 12/28/2021    AST 19 12/28/2021    ALT 14 12/28/2021    LABGLOM 40 (L) 12/28/2021    GFRAA 48 (L) 12/28/2021    AGRATIO 1.1 02/28/2020    GLOB 2.9 02/28/2020      . last    Diagnosis:      1. Moderate recurrent major depression ([facility] [address]    2. MICHAEL (generalized anxiety disorder)      Plan:      Continue medications as prescribed by Dr Mota while provider what on medical leavei ncluding: Seroquel 25 mg po bid (12/21)        Zoloft 200 mg po daily (11/21)  · Start buspar for anxiety 10 mg bid. · CBT- what is within my possibility and what do I need to do to maintain control in my life.    Discussed risks and benefits of medications, as well as need for yearly lab work. Follow up with psychiatry in two weeks. Continue work with PCP to manage medical concerns, and [facility] for continued follow-up. No orders of the defined types were placed in this encounter. No orders of the defined types were placed in this encounter.       Pt interventions:    Discussed importance of medication adherence and Discussed self-care (sleep, nutrition, rewarding activities, social support, exercise)  used

## 2023-03-01 ENCOUNTER — TRANSCRIPTION ENCOUNTER (OUTPATIENT)
Age: 43
End: 2023-03-01

## 2023-03-01 NOTE — ASU PATIENT PROFILE, ADULT - NSICDXPASTSURGICALHX_GEN_ALL_CORE_FT
PAST SURGICAL HISTORY:  H/O  section     H/O ovarian cystectomy     History of placement of ear tubes     History of tonsillectomy     S/P ORIF (open reduction internal fixation) fracture

## 2023-03-02 ENCOUNTER — TRANSCRIPTION ENCOUNTER (OUTPATIENT)
Age: 43
End: 2023-03-02

## 2023-03-02 ENCOUNTER — OUTPATIENT (OUTPATIENT)
Dept: OUTPATIENT SERVICES | Facility: HOSPITAL | Age: 43
LOS: 1 days | End: 2023-03-02
Payer: COMMERCIAL

## 2023-03-02 VITALS
DIASTOLIC BLOOD PRESSURE: 89 MMHG | OXYGEN SATURATION: 97 % | RESPIRATION RATE: 14 BRPM | WEIGHT: 293 LBS | HEIGHT: 66 IN | SYSTOLIC BLOOD PRESSURE: 161 MMHG | TEMPERATURE: 98 F | HEART RATE: 78 BPM

## 2023-03-02 VITALS
OXYGEN SATURATION: 96 % | RESPIRATION RATE: 14 BRPM | SYSTOLIC BLOOD PRESSURE: 114 MMHG | HEART RATE: 89 BPM | DIASTOLIC BLOOD PRESSURE: 66 MMHG

## 2023-03-02 DIAGNOSIS — E66.9 OBESITY, UNSPECIFIED: ICD-10-CM

## 2023-03-02 DIAGNOSIS — N93.9 ABNORMAL UTERINE AND VAGINAL BLEEDING, UNSPECIFIED: ICD-10-CM

## 2023-03-02 DIAGNOSIS — Z90.89 ACQUIRED ABSENCE OF OTHER ORGANS: Chronic | ICD-10-CM

## 2023-03-02 DIAGNOSIS — Z98.891 HISTORY OF UTERINE SCAR FROM PREVIOUS SURGERY: Chronic | ICD-10-CM

## 2023-03-02 DIAGNOSIS — R93.89 ABNORMAL FINDINGS ON DIAGNOSTIC IMAGING OF OTHER SPECIFIED BODY STRUCTURES: ICD-10-CM

## 2023-03-02 DIAGNOSIS — Z01.818 ENCOUNTER FOR OTHER PREPROCEDURAL EXAMINATION: ICD-10-CM

## 2023-03-02 DIAGNOSIS — D25.9 LEIOMYOMA OF UTERUS, UNSPECIFIED: ICD-10-CM

## 2023-03-02 DIAGNOSIS — Z96.22 MYRINGOTOMY TUBE(S) STATUS: Chronic | ICD-10-CM

## 2023-03-02 DIAGNOSIS — Z98.890 OTHER SPECIFIED POSTPROCEDURAL STATES: Chronic | ICD-10-CM

## 2023-03-02 LAB
ABO RH CONFIRMATION: SIGNIFICANT CHANGE UP
HCG UR QL: NEGATIVE — SIGNIFICANT CHANGE UP

## 2023-03-02 PROCEDURE — 88305 TISSUE EXAM BY PATHOLOGIST: CPT

## 2023-03-02 PROCEDURE — 88305 TISSUE EXAM BY PATHOLOGIST: CPT | Mod: 26

## 2023-03-02 PROCEDURE — 36415 COLL VENOUS BLD VENIPUNCTURE: CPT

## 2023-03-02 PROCEDURE — 58561 HYSTEROSCOPY REMOVE MYOMA: CPT

## 2023-03-02 PROCEDURE — 81025 URINE PREGNANCY TEST: CPT

## 2023-03-02 DEVICE — MYOSURE TISSUE REMOVAL DEVICE REACH
Type: IMPLANTABLE DEVICE | Status: NON-FUNCTIONAL
Removed: 2023-03-02

## 2023-03-02 DEVICE — MYOSURE TISSUE REMOVAL FMS FOR FLUENT XL
Type: IMPLANTABLE DEVICE | Status: NON-FUNCTIONAL
Removed: 2023-03-02

## 2023-03-02 DEVICE — MYOSURE TISSUE REMOVAL DEVICE LITE
Type: IMPLANTABLE DEVICE | Status: NON-FUNCTIONAL
Removed: 2023-03-02

## 2023-03-02 RX ORDER — RIZATRIPTAN BENZOATE 5 MG/1
1 TABLET ORAL
Qty: 0 | Refills: 0 | DISCHARGE

## 2023-03-02 RX ORDER — SODIUM CHLORIDE 9 MG/ML
1000 INJECTION, SOLUTION INTRAVENOUS
Refills: 0 | Status: DISCONTINUED | OUTPATIENT
Start: 2023-03-02 | End: 2023-03-02

## 2023-03-02 RX ORDER — BUPROPION HYDROCHLORIDE 150 MG/1
1 TABLET, EXTENDED RELEASE ORAL
Qty: 0 | Refills: 0 | DISCHARGE

## 2023-03-02 RX ORDER — TOPIRAMATE 25 MG
1 TABLET ORAL
Qty: 0 | Refills: 0 | DISCHARGE

## 2023-03-02 RX ORDER — SODIUM CHLORIDE 9 MG/ML
1000 INJECTION, SOLUTION INTRAVENOUS
Refills: 0 | Status: ACTIVE | OUTPATIENT
Start: 2023-03-02 | End: 2024-01-29

## 2023-03-02 RX ORDER — ACETAMINOPHEN 500 MG
975 TABLET ORAL ONCE
Refills: 0 | Status: COMPLETED | OUTPATIENT
Start: 2023-03-02 | End: 2023-03-02

## 2023-03-02 RX ORDER — HYDROMORPHONE HYDROCHLORIDE 2 MG/ML
1 INJECTION INTRAMUSCULAR; INTRAVENOUS; SUBCUTANEOUS
Refills: 0 | Status: DISCONTINUED | OUTPATIENT
Start: 2023-03-02 | End: 2023-03-02

## 2023-03-02 RX ORDER — FLUOXETINE HCL 10 MG
60 CAPSULE ORAL
Qty: 0 | Refills: 0 | DISCHARGE

## 2023-03-02 RX ORDER — METOCLOPRAMIDE HCL 10 MG
10 TABLET ORAL ONCE
Refills: 0 | Status: ACTIVE | OUTPATIENT
Start: 2023-03-02 | End: 2024-01-29

## 2023-03-02 RX ORDER — ONDANSETRON 8 MG/1
4 TABLET, FILM COATED ORAL ONCE
Refills: 0 | Status: ACTIVE | OUTPATIENT
Start: 2023-03-02 | End: 2024-01-29

## 2023-03-02 RX ORDER — FINGOLIMOD HCL 0.5 MG/1
1 CAPSULE ORAL
Qty: 0 | Refills: 0 | DISCHARGE

## 2023-03-02 RX ORDER — HYDROMORPHONE HYDROCHLORIDE 2 MG/ML
0.5 INJECTION INTRAMUSCULAR; INTRAVENOUS; SUBCUTANEOUS
Refills: 0 | Status: ACTIVE | OUTPATIENT
Start: 2023-03-02 | End: 2023-03-09

## 2023-03-02 RX ORDER — FAMOTIDINE 10 MG/ML
1 INJECTION INTRAVENOUS
Qty: 0 | Refills: 0 | DISCHARGE

## 2023-03-02 RX ORDER — CHOLECALCIFEROL (VITAMIN D3) 125 MCG
1 CAPSULE ORAL
Qty: 0 | Refills: 0 | DISCHARGE

## 2023-03-02 RX ADMIN — Medication 975 MILLIGRAM(S): at 10:44

## 2023-03-02 RX ADMIN — SODIUM CHLORIDE 100 MILLILITER(S): 9 INJECTION, SOLUTION INTRAVENOUS at 10:18

## 2023-03-02 RX ADMIN — Medication 975 MILLIGRAM(S): at 10:17

## 2023-03-02 RX ADMIN — SODIUM CHLORIDE 75 MILLILITER(S): 9 INJECTION, SOLUTION INTRAVENOUS at 13:53

## 2023-03-02 NOTE — BRIEF OPERATIVE NOTE - NSICDXBRIEFPROCEDURE_GEN_ALL_CORE_FT
PROCEDURES:  Dilation and curettage, uterus, with polypectomy or myomectomy using MyoSure tissue removal system 02-Mar-2023 12:42:01  Omid Pan

## 2023-03-02 NOTE — BRIEF OPERATIVE NOTE - NSICDXBRIEFPOSTOP_GEN_ALL_CORE_FT
POST-OP DIAGNOSIS:  Abnormal uterine bleeding due to endometrial polyp 02-Mar-2023 12:42:34  Omid Pan  Uterine myoma 02-Mar-2023 12:42:47  Omid Pan

## 2023-03-03 LAB — SURGICAL PATHOLOGY STUDY: SIGNIFICANT CHANGE UP

## 2023-03-21 ENCOUNTER — APPOINTMENT (OUTPATIENT)
Dept: OBGYN | Facility: CLINIC | Age: 43
End: 2023-03-21

## 2023-04-03 ENCOUNTER — APPOINTMENT (OUTPATIENT)
Dept: ORTHOPEDIC SURGERY | Facility: CLINIC | Age: 43
End: 2023-04-03
Payer: COMMERCIAL

## 2023-04-03 DIAGNOSIS — M19.072 PRIMARY OSTEOARTHRITIS, LEFT ANKLE AND FOOT: ICD-10-CM

## 2023-04-03 DIAGNOSIS — M79.672 PAIN IN RIGHT FOOT: ICD-10-CM

## 2023-04-03 DIAGNOSIS — M19.071 PRIMARY OSTEOARTHRITIS, RIGHT ANKLE AND FOOT: ICD-10-CM

## 2023-04-03 DIAGNOSIS — M77.42 METATARSALGIA, LEFT FOOT: ICD-10-CM

## 2023-04-03 DIAGNOSIS — M79.671 PAIN IN RIGHT FOOT: ICD-10-CM

## 2023-04-03 DIAGNOSIS — M77.41 METATARSALGIA, RIGHT FOOT: ICD-10-CM

## 2023-04-03 PROCEDURE — 99213 OFFICE O/P EST LOW 20 MIN: CPT

## 2023-04-03 NOTE — PHYSICAL EXAM
[de-identified] : General: Alert and oriented x3. In no acute distress. Pleasant in nature with a normal affect. No apparent respiratory distress.\par Left Foot Exam\par Skin: Clean, dry, intact\par Inspection: No obvious malalignment, no masses, no swelling, no effusion\par Pulses: 2+ DP/PT pulses\par ROM: FOOT Full  ROM of digits, ANKLE 10 degrees of dorsiflexion, 40 degrees of plantarflexion, 10 degrees of subtalar motion.\par Painful ROM: None\par Tenderness: No tenderness over the medial malleolus, No tenderness over the lateral malleolus, no CFL/ATFL/PTFL pain, no deltoid ligament pain. No heel pain. No Achilles tenderness. No 5th metatarsal pain. No pain to the LisFranc joint. No ttp over the posterior tibial tendon.\par Stability: Negative anterior/posterior drawer.\par Strength: 5/5 ADD/ABD/TA/GS/EHL/FHL/EDL\par Neuro: Sensation in tact to light touch throughout\par Additional tests: Negative Mortons test, negative tarsal tunnel tinels, negative single heel rise. \par \par Right Foot Exam\par Skin: Clean, dry, intact\par Inspection: No obvious malalignment, no masses, no swelling, no effusion\par Pulses: 2+ DP/PT pulses\par ROM: FOOT Full  ROM of digits, ANKLE 10 degrees of dorsiflexion, 40 degrees of plantarflexion, 10 degrees of subtalar motion.\par Painful ROM: None\par Tenderness: Distal MTP tenderness. No tenderness over the medial malleolus, No tenderness over the lateral malleolus, no CFL/ATFL/PTFL pain, no deltoid ligament pain. No heel pain. No Achilles tenderness. No 5th metatarsal pain. No pain to the LisFranc joint. No ttp over the posterior tibial tendon.\par Stability: Negative anterior/posterior drawer.\par Strength: 5/5 ADD/ABD/TA/GS/EHL/FHL/EDL\par Neuro: Sensation in tact to light touch throughout\par Additional tests: Negative Mortons test, negative tarsal tunnel tinels, negative single heel rise. \par  [de-identified] : \par \par EXAM: [id number] - MR FOOT RT - ORDERED BY: BERRY REICH\par \par \par PROCEDURE DATE: 01/25/2023\par \par \par \par INTERPRETATION: RIGHT FOOT MRI\par \par CLINICAL INDICATION: Chronic bilateral dorsal foot pain and swelling. No prior surgery.\par \par COMPARISON: None available.\par \par TECHNIQUE: Multiplanar, multisequence MRI was obtained of the right forefoot.\par \par FINDINGS:\par \par OSSEOUS: No acute fracture, osteonecrosis, or aggressive neoplasm. Mild to moderate tarsometatarsal arthrosis, most advanced at the second TMT including small subchondral fibrocystic change with mild surrounding osteitis along the plantar margin of the second metatarsal base. Background healed remote minimally impacted Lisfranc fracture injury but without widening of the interval or other convincing evidence for instability on this nonweightbearing examination. Mild to moderate arthrosis at the first MTP including mild patchy osteitis within the fibular sided hallux sesamoid and 1.0 cm adjacent synovial cyst/ganglion along its proximal margin near the insertion of the flexor hallucis brevis, occurring on a background of mild hallux valgus. Bunionette deformity at the fifth MTP.\par \par SYNOVIUM: No effusions.\par \par TENDONS: Visualized flexor and extensor tendons are intact.\par \par LIGAMENTS: Small chronic grade 2 sprain of the Lisfranc ligament including partial-thickness tear of the dorsal plate and, but with intact fibers throughout. Chronic grade 2/3 capsular sprain injury at the first MTP including at least partial-thickness plantar plate tear. Grade subacute on grade 2 chronic capsular sprain injuries at the second through fifth MTPs.\par \par GENERAL: Nonspecific mild patchy superficial subcutaneous edema along the dorsolateral forefoot overlying the third through fifth metatarsal shafts. Subcentimeter synovial cyst/ganglion along the dorsal margin of the talonavicular joint. No hematoma or drainable encapsulated fluid collection. Trace reactive fluid within the first and third intermetatarsal bursa without fito bursitis. Perineural fibrosis within the second interdigital webspace without discrete sizable neuroma. Chronic grade 2 strain of the abductor digiti minimi.\par \par IMPRESSION:\par \par 1. Mild to moderate tarsometatarsal arthrosis most advanced at right second TMT, occurring on a background of healed remote minimally impacted Lisfranc fracture injury.\par 2. Mild to moderate arthrosis and mild hallux valgus at right first MTP with mild sesamoiditis versus subacute contusion of the fibular sided hallux sesamoid and small adjacent synovial cyst/ganglion along its proximal margin.\par \par --- End of Report ---\par \par \par \par \par \par \par FAROOQ MCCALL MD; Attending Radiologist\par This document has been electronically signed. Jan 27 2023 11:28AM\par \par EXAM: [id number] - MR FOOT LT - ORDERED BY: BERRY REICH\par \par \par PROCEDURE DATE: 01/25/2023\par \par \par \par INTERPRETATION: LEFT FOOT MRI\par \par CLINICAL INDICATION: Chronic bilateral dorsal foot pain and swelling.\par \par COMPARISON: None available.\par \par TECHNIQUE: Multiplanar, multisequence MRI was obtained of the left foot.\par \par FINDINGS:\par \par OSSEOUS: No acute fracture, osteonecrosis, or aggressive neoplasm. Lisfranc interval is not widened on this non-weightbearing examination. Mild tarsometatarsal arthrosis. Mild to moderate midfoot arthrosis, most advanced at the medial-middle intercuneiform joint with multiloculated synovial cyst/ganglion along its dorsal proximal margin measuring 1.5 cm in greatest dimension, with local mass effect resulting in subtle displacement of adjacent dorsalis pedis neurovascular bundle. Mild arthrosis and mild hallux valgus at the first MTP. Suspect pes planus, incompletely evaluated.\par \par SYNOVIUM: No effusions.\par \par TENDONS: Visualized flexor and extensor tendons are intact. Subcentimeter synovial/tenosynovial cyst surrounding the common flexor tendon tendon of the second ray at the level of the neck of the proximal phalanx. Mild tibialis anterior insertional tendinitis without tear.\par \par LIGAMENTS: Lisfranc ligament is intact. Chronic grade 2 capsular sprain injury at the first MTP. Grade 1 subacute on grade 2 chronic capsular sprain injuries at the second through fifth MCPs.\par \par GENERAL: Nonspecific mild patchy dorsolateral subcutaneous soft tissue edema like signal along overlying the third through fifth metatarsals. No hematoma or drainable encapsulated fluid collection. Additional subcentimeter synovial cyst/ganglion along the medial margin of the naviculocuneiform joint. Small volume fluid within the first through third intermetatarsal bursae with mild peribursal edema. No interdigital neuroma. Small chronic grade 2 strain of the abductor digiti minimi.\par \par IMPRESSION:\par \par 1. Mild to moderate left midfoot arthrosis, most advanced at the medial-middle intercuneiform joint with 1.5 cm synovial cyst/ganglion along its dorsal proximal margin resulting in mild mass effect upon the dorsalis pedis neurovascular bundle. Correlate for potential lateral femoral branch deep peroneal neuropathy.\par 2. Mild left tibialis anterior insertional tendinitis without tear.\par 3. Possible mild left first through third intermetatarsal bursitis.\par 4. Suspect background pes planus. Consider follow-up weightbearing radiographs.\par 5. Additional lower grade and chronic findings as detailed in body section of this report.\par \par --- End of Report ---\par \par \par \par \par \par \par FAROOQ MCCALL MD; Attending Radiologist\par This document has been electronically signed. Jan 27 2023 11:42AM

## 2023-04-03 NOTE — HISTORY OF PRESENT ILLNESS
[FreeTextEntry1] : 04/03/2023: LEDA MUSE is a 42 year old female who presents for follow up evaluation of bilateral foot pain. She states that her pain has improved since her last visit. She is not currently taking medication for pain management. No other complaints at this time.\par \par 2/1/2023: The patient is a 41 yo female presenting for a MRI review of bilateral foot pain. The patient states that her symptoms are unchanged since her previous visit. She presents today wearing sneakers and is ambulating without assistance.\par \par 1/13/2023: 42 year old female presenting with bilateral foot pain. The patient states in 2017 she received a left foot hairline fracture s/p injury, and was placed in a boot for 4 months. She currently has right greater than left foot pain, that has been progressing. She describes her foot pain as a "pressure from abnormal bending", mostly in the dorsal foot. She denies any trauma or injury to the right foot or ankle. She tried custom orthotics previously, which increased her pain. The patient has MS, on Gilenya daily. States since post partum in 04/2018, she has noticed significant decline in overall bodily functionality. Concerned of her currently ability to exercise, since walking was her main method of exercise. She reports overall decreased activity since foot pain began.  She is currently taking no pain medication. She is wearing sneakers and ambulating without assistive devices. No other complaints at this time. \par

## 2023-04-04 PROBLEM — I82.409 ACUTE EMBOLISM AND THROMBOSIS OF UNSPECIFIED DEEP VEINS OF UNSPECIFIED LOWER EXTREMITY: Chronic | Status: ACTIVE | Noted: 2023-02-21

## 2023-04-04 PROBLEM — J30.1 ALLERGIC RHINITIS DUE TO POLLEN: Chronic | Status: ACTIVE | Noted: 2023-02-21

## 2023-04-04 PROBLEM — G43.909 MIGRAINE, UNSPECIFIED, NOT INTRACTABLE, WITHOUT STATUS MIGRAINOSUS: Chronic | Status: ACTIVE | Noted: 2023-02-21

## 2023-04-04 PROBLEM — I26.99 OTHER PULMONARY EMBOLISM WITHOUT ACUTE COR PULMONALE: Chronic | Status: ACTIVE | Noted: 2023-02-21

## 2023-04-04 PROBLEM — D25.9 LEIOMYOMA OF UTERUS, UNSPECIFIED: Chronic | Status: ACTIVE | Noted: 2023-02-21

## 2023-04-04 PROBLEM — E78.00 PURE HYPERCHOLESTEROLEMIA, UNSPECIFIED: Chronic | Status: ACTIVE | Noted: 2023-02-21

## 2023-04-04 PROBLEM — M19.90 UNSPECIFIED OSTEOARTHRITIS, UNSPECIFIED SITE: Chronic | Status: ACTIVE | Noted: 2023-02-21

## 2023-04-04 PROBLEM — F41.8 OTHER SPECIFIED ANXIETY DISORDERS: Chronic | Status: ACTIVE | Noted: 2023-02-21

## 2023-04-04 PROBLEM — E66.9 OBESITY, UNSPECIFIED: Chronic | Status: ACTIVE | Noted: 2023-02-21

## 2023-04-04 PROBLEM — N93.9 ABNORMAL UTERINE AND VAGINAL BLEEDING, UNSPECIFIED: Chronic | Status: ACTIVE | Noted: 2023-02-21

## 2023-04-04 PROBLEM — K21.9 GASTRO-ESOPHAGEAL REFLUX DISEASE WITHOUT ESOPHAGITIS: Chronic | Status: ACTIVE | Noted: 2023-02-21

## 2023-04-04 PROBLEM — J45.909 UNSPECIFIED ASTHMA, UNCOMPLICATED: Chronic | Status: ACTIVE | Noted: 2023-02-21

## 2023-06-19 NOTE — H&P PST ADULT - PROBLEM SELECTOR PROBLEM 1
[No studies available for review at this time.] : No studies available for review at this time.
Leiomyoma of uterus, unspecified

## 2023-07-27 ENCOUNTER — APPOINTMENT (OUTPATIENT)
Dept: OBGYN | Facility: CLINIC | Age: 43
End: 2023-07-27
Payer: COMMERCIAL

## 2023-07-27 DIAGNOSIS — Z71.9 COUNSELING, UNSPECIFIED: ICD-10-CM

## 2023-07-27 PROCEDURE — 99215 OFFICE O/P EST HI 40 MIN: CPT

## 2023-07-27 NOTE — PLAN
[FreeTextEntry1] : Discussed with pt that the main concern is that there is a higher risk of down syndrome because of her age. Recommended IVF as it may be more difficult to conceive. Pt made aware that taking antidepressants may lead to a higher change of a heart defect. Pt was concerned about postpartum depression, notes that she has a therapist. All questions answered. \par \par \par \par

## 2023-07-27 NOTE — COUNSELING
[Preconception Care/ Fertility options] : preconception care, fertility options [Pregnancy Options] : pregnancy options [Contraception/ Emergency Contraception/ Safe Sexual Practices] : contraception, emergency contraception, safe sexual practices [FreeTextEntry2] : The patient, her spouse, and I had a prolonged 45-minute discussion in terms of pregnancy and its risks.  We discussed the risks of her morbid obesity including but not exclusive to complications at delivery, complications during pregnancy such as hypertension diabetes and thrombotic events.  We discussed the possibility of  and its limitations.  We also discussed multiple sclerosis in pregnancy and is currently my recommendation for her to stop the medication prior to conception, however, a genetic consultation was recommended.  We also discussed the risks of infertility given her age of 43, morbid obesity, and multiple medication usages.  We also discussed her postpartum depression and the risks of continuing her antidepressant of drugs which include Prozac and Wellbutrin during a pregnancy.  There also is a family history of congenital heart defects and given the patient's history of medication usage and that her son was born with a cardiac defect recommendation for fetal echo will be made.  I do desire the patient to see the genetic counselor to discuss her medication usage.  Given her difficulty in conception and her age at 43 referral to her assisted reproductive specialist is given.  All questions were answered and is greater than 45-minute face-to-face consultation.  The patient and her  were very happy with our appointment and our discussion.

## 2023-07-27 NOTE — HISTORY OF PRESENT ILLNESS
[FreeTextEntry1] : Pt presents today for a consultation to get some information as she is thinking about having another pregnancy.

## 2023-08-02 ENCOUNTER — NON-APPOINTMENT (OUTPATIENT)
Age: 43
End: 2023-08-02

## 2023-08-07 ENCOUNTER — APPOINTMENT (OUTPATIENT)
Dept: OBGYN | Facility: CLINIC | Age: 43
End: 2023-08-07
Payer: COMMERCIAL

## 2023-08-07 PROCEDURE — 76857 US EXAM PELVIC LIMITED: CPT

## 2023-08-07 PROCEDURE — 76830 TRANSVAGINAL US NON-OB: CPT

## 2023-09-14 ENCOUNTER — APPOINTMENT (OUTPATIENT)
Dept: MATERNAL FETAL MEDICINE | Facility: CLINIC | Age: 43
End: 2023-09-14
Payer: COMMERCIAL

## 2023-09-14 ENCOUNTER — ASOB RESULT (OUTPATIENT)
Age: 43
End: 2023-09-14

## 2023-09-14 PROCEDURE — 99202 OFFICE O/P NEW SF 15 MIN: CPT | Mod: 95

## 2023-09-20 DIAGNOSIS — Z01.411 ENCOUNTER FOR GYNECOLOGICAL EXAMINATION (GENERAL) (ROUTINE) WITH ABNORMAL FINDINGS: ICD-10-CM

## 2023-09-20 DIAGNOSIS — L83 ACANTHOSIS NIGRICANS: ICD-10-CM

## 2023-09-20 DIAGNOSIS — L72.11 PILAR CYST: ICD-10-CM

## 2023-09-20 DIAGNOSIS — Z86.018 PERSONAL HISTORY OF OTHER BENIGN NEOPLASM: ICD-10-CM

## 2023-09-20 DIAGNOSIS — Z87.2 PERSONAL HISTORY OF DISEASES OF THE SKIN AND SUBCUTANEOUS TISSUE: ICD-10-CM

## 2023-09-20 DIAGNOSIS — L30.4 ERYTHEMA INTERTRIGO: ICD-10-CM

## 2023-09-20 DIAGNOSIS — R20.8 OTHER DISTURBANCES OF SKIN SENSATION: ICD-10-CM

## 2023-09-21 ENCOUNTER — APPOINTMENT (OUTPATIENT)
Dept: ANTEPARTUM | Facility: CLINIC | Age: 43
End: 2023-09-21
Payer: COMMERCIAL

## 2023-09-21 ENCOUNTER — APPOINTMENT (OUTPATIENT)
Dept: MATERNAL FETAL MEDICINE | Facility: CLINIC | Age: 43
End: 2023-09-21
Payer: COMMERCIAL

## 2023-09-21 VITALS
WEIGHT: 293 LBS | HEART RATE: 85 BPM | SYSTOLIC BLOOD PRESSURE: 130 MMHG | BODY MASS INDEX: 57.46 KG/M2 | DIASTOLIC BLOOD PRESSURE: 82 MMHG | OXYGEN SATURATION: 95 %

## 2023-09-21 DIAGNOSIS — Z31.69 ENCOUNTER FOR OTHER GENERAL COUNSELING AND ADVICE ON PROCREATION: ICD-10-CM

## 2023-09-21 PROCEDURE — 99215 OFFICE O/P EST HI 40 MIN: CPT

## 2023-09-21 PROCEDURE — ZZZZZ: CPT

## 2023-09-21 RX ORDER — FLUOXETINE HYDROCHLORIDE 20 MG/1
20 CAPSULE ORAL
Qty: 270 | Refills: 0 | Status: ACTIVE | COMMUNITY
Start: 2022-09-21

## 2023-09-21 RX ORDER — FAMOTIDINE 20 MG/1
20 TABLET, FILM COATED ORAL
Qty: 60 | Refills: 0 | Status: ACTIVE | COMMUNITY
Start: 2022-06-11

## 2023-09-21 RX ORDER — BUPROPION HYDROCHLORIDE 300 MG/1
300 TABLET, EXTENDED RELEASE ORAL
Qty: 90 | Refills: 0 | Status: ACTIVE | COMMUNITY
Start: 2022-06-09

## 2023-09-21 RX ORDER — MISOPROSTOL 200 UG/1
200 TABLET ORAL ONCE
Qty: 2 | Refills: 0 | Status: DISCONTINUED | COMMUNITY
Start: 2022-12-05 | End: 2023-09-21

## 2023-11-01 ENCOUNTER — TRANSCRIPTION ENCOUNTER (OUTPATIENT)
Age: 43
End: 2023-11-01

## 2023-11-16 ENCOUNTER — EMERGENCY (EMERGENCY)
Facility: HOSPITAL | Age: 43
LOS: 0 days | Discharge: HOME | End: 2023-11-16
Attending: STUDENT IN AN ORGANIZED HEALTH CARE EDUCATION/TRAINING PROGRAM
Payer: COMMERCIAL

## 2023-11-16 VITALS
DIASTOLIC BLOOD PRESSURE: 98 MMHG | TEMPERATURE: 98 F | RESPIRATION RATE: 16 BRPM | SYSTOLIC BLOOD PRESSURE: 167 MMHG | HEIGHT: 66 IN | HEART RATE: 109 BPM | WEIGHT: 293 LBS | OXYGEN SATURATION: 98 %

## 2023-11-16 VITALS
DIASTOLIC BLOOD PRESSURE: 58 MMHG | HEART RATE: 103 BPM | OXYGEN SATURATION: 98 % | RESPIRATION RATE: 18 BRPM | SYSTOLIC BLOOD PRESSURE: 122 MMHG | TEMPERATURE: 98 F

## 2023-11-16 DIAGNOSIS — Z98.890 OTHER SPECIFIED POSTPROCEDURAL STATES: Chronic | ICD-10-CM

## 2023-11-16 DIAGNOSIS — Z96.22 MYRINGOTOMY TUBE(S) STATUS: Chronic | ICD-10-CM

## 2023-11-16 DIAGNOSIS — D72.829 ELEVATED WHITE BLOOD CELL COUNT, UNSPECIFIED: ICD-10-CM

## 2023-11-16 DIAGNOSIS — T45.0X5A ADVERSE EFFECT OF ANTIALLERGIC AND ANTIEMETIC DRUGS, INITIAL ENCOUNTER: ICD-10-CM

## 2023-11-16 DIAGNOSIS — F32.A DEPRESSION, UNSPECIFIED: ICD-10-CM

## 2023-11-16 DIAGNOSIS — T47.0X5A ADVERSE EFFECT OF HISTAMINE H2-RECEPTOR BLOCKERS, INITIAL ENCOUNTER: ICD-10-CM

## 2023-11-16 DIAGNOSIS — Z90.89 ACQUIRED ABSENCE OF OTHER ORGANS: Chronic | ICD-10-CM

## 2023-11-16 DIAGNOSIS — Z98.891 HISTORY OF UTERINE SCAR FROM PREVIOUS SURGERY: Chronic | ICD-10-CM

## 2023-11-16 DIAGNOSIS — Z88.2 ALLERGY STATUS TO SULFONAMIDES: ICD-10-CM

## 2023-11-16 DIAGNOSIS — R00.0 TACHYCARDIA, UNSPECIFIED: ICD-10-CM

## 2023-11-16 DIAGNOSIS — F41.9 ANXIETY DISORDER, UNSPECIFIED: ICD-10-CM

## 2023-11-16 DIAGNOSIS — J45.909 UNSPECIFIED ASTHMA, UNCOMPLICATED: ICD-10-CM

## 2023-11-16 DIAGNOSIS — T38.0X5A ADVERSE EFFECT OF GLUCOCORTICOIDS AND SYNTHETIC ANALOGUES, INITIAL ENCOUNTER: ICD-10-CM

## 2023-11-16 DIAGNOSIS — R61 GENERALIZED HYPERHIDROSIS: ICD-10-CM

## 2023-11-16 LAB
ALBUMIN SERPL ELPH-MCNC: 3.7 G/DL — SIGNIFICANT CHANGE UP (ref 3.3–5)
ALBUMIN SERPL ELPH-MCNC: 3.7 G/DL — SIGNIFICANT CHANGE UP (ref 3.3–5)
ALP SERPL-CCNC: 63 U/L — SIGNIFICANT CHANGE UP (ref 40–120)
ALP SERPL-CCNC: 63 U/L — SIGNIFICANT CHANGE UP (ref 40–120)
ALT FLD-CCNC: 25 U/L — SIGNIFICANT CHANGE UP (ref 12–78)
ALT FLD-CCNC: 25 U/L — SIGNIFICANT CHANGE UP (ref 12–78)
ANION GAP SERPL CALC-SCNC: 7 MMOL/L — SIGNIFICANT CHANGE UP (ref 5–17)
ANION GAP SERPL CALC-SCNC: 7 MMOL/L — SIGNIFICANT CHANGE UP (ref 5–17)
AST SERPL-CCNC: 26 U/L — SIGNIFICANT CHANGE UP (ref 15–37)
AST SERPL-CCNC: 26 U/L — SIGNIFICANT CHANGE UP (ref 15–37)
BASOPHILS # BLD AUTO: 0.04 K/UL — SIGNIFICANT CHANGE UP (ref 0–0.2)
BASOPHILS # BLD AUTO: 0.04 K/UL — SIGNIFICANT CHANGE UP (ref 0–0.2)
BASOPHILS NFR BLD AUTO: 0.2 % — SIGNIFICANT CHANGE UP (ref 0–2)
BASOPHILS NFR BLD AUTO: 0.2 % — SIGNIFICANT CHANGE UP (ref 0–2)
BILIRUB SERPL-MCNC: 0.2 MG/DL — SIGNIFICANT CHANGE UP (ref 0.2–1.2)
BILIRUB SERPL-MCNC: 0.2 MG/DL — SIGNIFICANT CHANGE UP (ref 0.2–1.2)
BUN SERPL-MCNC: 14 MG/DL — SIGNIFICANT CHANGE UP (ref 7–23)
BUN SERPL-MCNC: 14 MG/DL — SIGNIFICANT CHANGE UP (ref 7–23)
CALCIUM SERPL-MCNC: 8.7 MG/DL — SIGNIFICANT CHANGE UP (ref 8.5–10.1)
CALCIUM SERPL-MCNC: 8.7 MG/DL — SIGNIFICANT CHANGE UP (ref 8.5–10.1)
CHLORIDE SERPL-SCNC: 112 MMOL/L — HIGH (ref 96–108)
CHLORIDE SERPL-SCNC: 112 MMOL/L — HIGH (ref 96–108)
CO2 SERPL-SCNC: 21 MMOL/L — LOW (ref 22–31)
CO2 SERPL-SCNC: 21 MMOL/L — LOW (ref 22–31)
CREAT SERPL-MCNC: 1.06 MG/DL — SIGNIFICANT CHANGE UP (ref 0.5–1.3)
CREAT SERPL-MCNC: 1.06 MG/DL — SIGNIFICANT CHANGE UP (ref 0.5–1.3)
EGFR: 68 ML/MIN/1.73M2 — SIGNIFICANT CHANGE UP
EGFR: 68 ML/MIN/1.73M2 — SIGNIFICANT CHANGE UP
EOSINOPHIL # BLD AUTO: 0.01 K/UL — SIGNIFICANT CHANGE UP (ref 0–0.5)
EOSINOPHIL # BLD AUTO: 0.01 K/UL — SIGNIFICANT CHANGE UP (ref 0–0.5)
EOSINOPHIL NFR BLD AUTO: 0.1 % — SIGNIFICANT CHANGE UP (ref 0–6)
EOSINOPHIL NFR BLD AUTO: 0.1 % — SIGNIFICANT CHANGE UP (ref 0–6)
GLUCOSE SERPL-MCNC: 182 MG/DL — HIGH (ref 70–99)
GLUCOSE SERPL-MCNC: 182 MG/DL — HIGH (ref 70–99)
HCG SERPL-ACNC: <1 MIU/ML — SIGNIFICANT CHANGE UP
HCG SERPL-ACNC: <1 MIU/ML — SIGNIFICANT CHANGE UP
HCT VFR BLD CALC: 37.6 % — SIGNIFICANT CHANGE UP (ref 34.5–45)
HCT VFR BLD CALC: 37.6 % — SIGNIFICANT CHANGE UP (ref 34.5–45)
HGB BLD-MCNC: 13 G/DL — SIGNIFICANT CHANGE UP (ref 11.5–15.5)
HGB BLD-MCNC: 13 G/DL — SIGNIFICANT CHANGE UP (ref 11.5–15.5)
HYPOCHROMIA BLD QL: SLIGHT — SIGNIFICANT CHANGE UP
HYPOCHROMIA BLD QL: SLIGHT — SIGNIFICANT CHANGE UP
IMM GRANULOCYTES NFR BLD AUTO: 0.3 % — SIGNIFICANT CHANGE UP (ref 0–0.9)
IMM GRANULOCYTES NFR BLD AUTO: 0.3 % — SIGNIFICANT CHANGE UP (ref 0–0.9)
LYMPHOCYTES # BLD AUTO: 0.25 K/UL — LOW (ref 1–3.3)
LYMPHOCYTES # BLD AUTO: 0.25 K/UL — LOW (ref 1–3.3)
LYMPHOCYTES # BLD AUTO: 1.4 % — LOW (ref 13–44)
LYMPHOCYTES # BLD AUTO: 1.4 % — LOW (ref 13–44)
MAGNESIUM SERPL-MCNC: 2 MG/DL — SIGNIFICANT CHANGE UP (ref 1.6–2.6)
MAGNESIUM SERPL-MCNC: 2 MG/DL — SIGNIFICANT CHANGE UP (ref 1.6–2.6)
MANUAL SMEAR VERIFICATION: SIGNIFICANT CHANGE UP
MANUAL SMEAR VERIFICATION: SIGNIFICANT CHANGE UP
MCHC RBC-ENTMCNC: 30.4 PG — SIGNIFICANT CHANGE UP (ref 27–34)
MCHC RBC-ENTMCNC: 30.4 PG — SIGNIFICANT CHANGE UP (ref 27–34)
MCHC RBC-ENTMCNC: 34.6 GM/DL — SIGNIFICANT CHANGE UP (ref 32–36)
MCHC RBC-ENTMCNC: 34.6 GM/DL — SIGNIFICANT CHANGE UP (ref 32–36)
MCV RBC AUTO: 88.1 FL — SIGNIFICANT CHANGE UP (ref 80–100)
MCV RBC AUTO: 88.1 FL — SIGNIFICANT CHANGE UP (ref 80–100)
MONOCYTES # BLD AUTO: 0.26 K/UL — SIGNIFICANT CHANGE UP (ref 0–0.9)
MONOCYTES # BLD AUTO: 0.26 K/UL — SIGNIFICANT CHANGE UP (ref 0–0.9)
MONOCYTES NFR BLD AUTO: 1.5 % — LOW (ref 2–14)
MONOCYTES NFR BLD AUTO: 1.5 % — LOW (ref 2–14)
NEUTROPHILS # BLD AUTO: 17 K/UL — HIGH (ref 1.8–7.4)
NEUTROPHILS # BLD AUTO: 17 K/UL — HIGH (ref 1.8–7.4)
NEUTROPHILS NFR BLD AUTO: 96.5 % — HIGH (ref 43–77)
NEUTROPHILS NFR BLD AUTO: 96.5 % — HIGH (ref 43–77)
PLAT MORPH BLD: NORMAL — SIGNIFICANT CHANGE UP
PLAT MORPH BLD: NORMAL — SIGNIFICANT CHANGE UP
PLATELET # BLD AUTO: 262 K/UL — SIGNIFICANT CHANGE UP (ref 150–400)
PLATELET # BLD AUTO: 262 K/UL — SIGNIFICANT CHANGE UP (ref 150–400)
POTASSIUM SERPL-MCNC: 3.9 MMOL/L — SIGNIFICANT CHANGE UP (ref 3.5–5.3)
POTASSIUM SERPL-MCNC: 3.9 MMOL/L — SIGNIFICANT CHANGE UP (ref 3.5–5.3)
POTASSIUM SERPL-SCNC: 3.9 MMOL/L — SIGNIFICANT CHANGE UP (ref 3.5–5.3)
POTASSIUM SERPL-SCNC: 3.9 MMOL/L — SIGNIFICANT CHANGE UP (ref 3.5–5.3)
PROT SERPL-MCNC: 7.6 GM/DL — SIGNIFICANT CHANGE UP (ref 6–8.3)
PROT SERPL-MCNC: 7.6 GM/DL — SIGNIFICANT CHANGE UP (ref 6–8.3)
RBC # BLD: 4.27 M/UL — SIGNIFICANT CHANGE UP (ref 3.8–5.2)
RBC # BLD: 4.27 M/UL — SIGNIFICANT CHANGE UP (ref 3.8–5.2)
RBC # FLD: 12.2 % — SIGNIFICANT CHANGE UP (ref 10.3–14.5)
RBC # FLD: 12.2 % — SIGNIFICANT CHANGE UP (ref 10.3–14.5)
RBC BLD AUTO: SIGNIFICANT CHANGE UP
RBC BLD AUTO: SIGNIFICANT CHANGE UP
SODIUM SERPL-SCNC: 140 MMOL/L — SIGNIFICANT CHANGE UP (ref 135–145)
SODIUM SERPL-SCNC: 140 MMOL/L — SIGNIFICANT CHANGE UP (ref 135–145)
TROPONIN I, HIGH SENSITIVITY RESULT: <3 NG/L — SIGNIFICANT CHANGE UP
TROPONIN I, HIGH SENSITIVITY RESULT: <3 NG/L — SIGNIFICANT CHANGE UP
WBC # BLD: 17.61 K/UL — HIGH (ref 3.8–10.5)
WBC # BLD: 17.61 K/UL — HIGH (ref 3.8–10.5)
WBC # FLD AUTO: 17.61 K/UL — HIGH (ref 3.8–10.5)
WBC # FLD AUTO: 17.61 K/UL — HIGH (ref 3.8–10.5)

## 2023-11-16 PROCEDURE — 84484 ASSAY OF TROPONIN QUANT: CPT

## 2023-11-16 PROCEDURE — 83735 ASSAY OF MAGNESIUM: CPT

## 2023-11-16 PROCEDURE — 36415 COLL VENOUS BLD VENIPUNCTURE: CPT

## 2023-11-16 PROCEDURE — 99285 EMERGENCY DEPT VISIT HI MDM: CPT | Mod: 25

## 2023-11-16 PROCEDURE — 80053 COMPREHEN METABOLIC PANEL: CPT

## 2023-11-16 PROCEDURE — 93010 ELECTROCARDIOGRAM REPORT: CPT

## 2023-11-16 PROCEDURE — 99285 EMERGENCY DEPT VISIT HI MDM: CPT

## 2023-11-16 PROCEDURE — 93005 ELECTROCARDIOGRAM TRACING: CPT

## 2023-11-16 PROCEDURE — 71045 X-RAY EXAM CHEST 1 VIEW: CPT | Mod: 26

## 2023-11-16 PROCEDURE — 71045 X-RAY EXAM CHEST 1 VIEW: CPT

## 2023-11-16 PROCEDURE — 84702 CHORIONIC GONADOTROPIN TEST: CPT

## 2023-11-16 PROCEDURE — 85025 COMPLETE CBC W/AUTO DIFF WBC: CPT

## 2023-11-16 RX ORDER — SODIUM CHLORIDE 9 MG/ML
1000 INJECTION INTRAMUSCULAR; INTRAVENOUS; SUBCUTANEOUS ONCE
Refills: 0 | Status: COMPLETED | OUTPATIENT
Start: 2023-11-16 | End: 2023-11-16

## 2023-11-16 RX ADMIN — SODIUM CHLORIDE 1000 MILLILITER(S): 9 INJECTION INTRAMUSCULAR; INTRAVENOUS; SUBCUTANEOUS at 17:10

## 2023-11-16 NOTE — ED PROVIDER NOTE - NSICDXPASTMEDICALHX_GEN_ALL_CORE_FT
PAST MEDICAL HISTORY:  Abnormal uterine and vaginal bleeding, unspecified     Acid reflux     Anxiety with depression     Arthritis     Asthma     Deep vein thrombosis (DVT) of lower extremity     Hay fever     Hypercholesterolemia     Leiomyoma of uterus, unspecified     Migraines     Obesity, unspecified     Pulmonary embolism       Multiple sclerosis

## 2023-11-16 NOTE — ED PROVIDER NOTE - PHYSICAL EXAMINATION
Constitutional: Obese, NAD   Eyes: PERRLA  Head: Normocephalic   Mouth: MMM  Cardiac: tachycardic, regular rhythm   Resp: Lungs CTAB  GI: Abd s/nt/nd, no rebound or guarding.  Neuro: awake, alert, moving all extremities  Skin: No rashes

## 2023-11-16 NOTE — ED ADULT NURSE NOTE - CHIEF COMPLAINT QUOTE
pt [facility] from [facility] for infusion reaction. pt was receiving ocrevus infusion for MS when she felt diaphoretic with palpitations. pt received 1000mg tylenol, 50 mg benedryl, 20mg pepcid and 500mg medrol prior to infusion. pt reports feeling "warm and my heart is racing." after infusion, pt's EKG showed prolonged QT. pt brought for STAT EKG.

## 2023-11-16 NOTE — ED ADULT TRIAGE NOTE - CHIEF COMPLAINT QUOTE
pt [facility] from [facility] for infusion reaction. pt was receiving ocrevus infusion for MS when she felt diaphoretic with palpitations. pt received 1000mg tylenol, 50 mg benedryl, 20mg pepcid and 500mg medrol prior to infusion. pt reports feeling "warm and my heart is racing." after infusion, pt's EKG showed prolonged QT. pt brought for STAT EKG.

## 2023-11-16 NOTE — ED PROVIDER NOTE - OBJECTIVE STATEMENT
42 y/o female with PMHx of MS, asthma, and anxiety/depression BIBEMS from infusion center c/o adverse reaction. Pt received 50mg benadryl, 20mg pepcid, 500mg solumedrol and then was started on ocrevus infusion for MS today, first time receiving the infusion. After completing the infusion and receiving IV fluids pt felt hot and diaphoretic with palpitations, pulse was checked pt found to be tachycardic. EKG performed there after infusion reportedly demonstrated prolonged QT and pt was advised to go to the ED by NP at the center for further evaluation. + Allergy to sulfa drugs.

## 2023-11-16 NOTE — ED PROVIDER NOTE - PROGRESS NOTE DETAILS
Chente: Patient feeling well. has leukocytosis. Patient is aware. Denies urinary sxs, SOB, cough, headache abdominal pain. Did recently receive steroids. Does not feel ill. Patient aware she needs to f/u to ensure it downtrends. Ambulating independently in ED. Tolerating PO. Stable for dc. Patient understands return precautions and f/u.

## 2023-11-16 NOTE — ED ADULT NURSE NOTE - OBJECTIVE STATEMENT
Pt in ED c/o palpitations.  Pt breathing symmetrical and unlabored, cardiac monitoring in place, #20 IV inserted into , bloods drawn and sent to lab.  Pt in no acute distress at this time.

## 2023-11-16 NOTE — ED PROVIDER NOTE - CLINICAL SUMMARY MEDICAL DECISION MAKING FREE TEXT BOX
42 y/o female presents with diaphoresis and tachycardia after receiving infusion today. Will order labs including troponin, EKG, CXR, IV fluids, and reassess for disposition.

## 2023-11-16 NOTE — ED PROVIDER NOTE - PATIENT PORTAL LINK FT
You can access the FollowMyHealth Patient Portal offered by [facility] by registering at the following website: [email or web address]. By joining [facility]’s FollowMyHealth portal, you will also be able to view your health information using other applications (apps) compatible with our system.

## 2023-11-16 NOTE — ED ADULT NURSE NOTE - NSFALLUNIVINTERV_ED_ALL_ED
Bed/Stretcher in lowest position, wheels locked, appropriate side rails in place/Call bell, personal items and telephone in reach/Instruct patient to call for assistance before getting out of bed/chair/stretcher/Non-slip footwear applied when patient is off stretcher/[place] to call system/Physically safe environment - no spills, clutter or unnecessary equipment/Purposeful proactive rounding/Room/bathroom lighting operational, light cord in reach

## 2024-02-03 ENCOUNTER — APPOINTMENT (OUTPATIENT)
Dept: ORTHOPEDIC SURGERY | Facility: CLINIC | Age: 44
End: 2024-02-03

## 2024-05-14 DIAGNOSIS — Z11.3 ENCOUNTER FOR SCREENING FOR INFECTIONS WITH A PREDOMINANTLY SEXUAL MODE OF TRANSMISSION: ICD-10-CM

## 2024-05-14 DIAGNOSIS — Z12.39 ENCOUNTER FOR OTHER SCREENING FOR MALIGNANT NEOPLASM OF BREAST: ICD-10-CM

## 2024-05-14 PROBLEM — G35 MULTIPLE SCLEROSIS: Chronic | Status: ACTIVE | Noted: 2023-11-16

## 2024-05-15 ENCOUNTER — LABORATORY RESULT (OUTPATIENT)
Age: 44
End: 2024-05-15

## 2024-05-15 ENCOUNTER — APPOINTMENT (OUTPATIENT)
Dept: OBGYN | Facility: CLINIC | Age: 44
End: 2024-05-15
Payer: COMMERCIAL

## 2024-05-15 VITALS
OXYGEN SATURATION: 98 % | DIASTOLIC BLOOD PRESSURE: 90 MMHG | SYSTOLIC BLOOD PRESSURE: 140 MMHG | HEIGHT: 66 IN | BODY MASS INDEX: 47.09 KG/M2 | RESPIRATION RATE: 16 BRPM | HEART RATE: 90 BPM | WEIGHT: 293 LBS

## 2024-05-15 DIAGNOSIS — Z12.4 ENCOUNTER FOR SCREENING FOR MALIGNANT NEOPLASM OF CERVIX: ICD-10-CM

## 2024-05-15 DIAGNOSIS — Z87.42 PERSONAL HISTORY OF OTHER DISEASES OF THE FEMALE GENITAL TRACT: ICD-10-CM

## 2024-05-15 DIAGNOSIS — R93.89 ABNORMAL FINDINGS ON DIAGNOSTIC IMAGING OF OTHER SPECIFIED BODY STRUCTURES: ICD-10-CM

## 2024-05-15 DIAGNOSIS — Z01.419 ENCOUNTER FOR GYNECOLOGICAL EXAMINATION (GENERAL) (ROUTINE) W/OUT ABNORMAL FINDINGS: ICD-10-CM

## 2024-05-15 DIAGNOSIS — Z12.39 ENCOUNTER FOR OTHER SCREENING FOR MALIGNANT NEOPLASM OF BREAST: ICD-10-CM

## 2024-05-15 DIAGNOSIS — Z13.31 ENCOUNTER FOR SCREENING FOR DEPRESSION: ICD-10-CM

## 2024-05-15 LAB
HCG UR QL: NEGATIVE
QUALITY CONTROL: YES

## 2024-05-15 PROCEDURE — 99396 PREV VISIT EST AGE 40-64: CPT

## 2024-05-15 PROCEDURE — 81025 URINE PREGNANCY TEST: CPT

## 2024-05-15 PROCEDURE — 99459 PELVIC EXAMINATION: CPT

## 2024-05-15 RX ORDER — MONTELUKAST SODIUM 10 MG/1
TABLET, FILM COATED ORAL
Refills: 0 | Status: ACTIVE | COMMUNITY

## 2024-05-15 RX ORDER — TOPIRAMATE 50 MG/1
50 TABLET, FILM COATED ORAL
Qty: 60 | Refills: 0 | Status: DISCONTINUED | COMMUNITY
Start: 2022-05-11 | End: 2024-05-15

## 2024-05-15 RX ORDER — TOPIRAMATE 150 MG/1
150 CAPSULE, EXTENDED RELEASE ORAL
Refills: 0 | Status: ACTIVE | COMMUNITY

## 2024-05-15 RX ORDER — AZELASTINE HYDROCHLORIDE 137 UG/1
137 SPRAY, METERED NASAL
Qty: 30 | Refills: 0 | Status: COMPLETED | COMMUNITY
Start: 2022-06-11 | End: 2024-05-15

## 2024-05-15 RX ORDER — BUPROPION HYDROCHLORIDE 150 MG/1
150 TABLET, FILM COATED, EXTENDED RELEASE ORAL
Refills: 0 | Status: ACTIVE | COMMUNITY

## 2024-05-15 RX ORDER — FLUTICASONE PROPIONATE AND SALMETEROL XINAFOATE 115; 21 UG/1; UG/1
115-21 AEROSOL, METERED RESPIRATORY (INHALATION)
Qty: 12 | Refills: 0 | Status: COMPLETED | COMMUNITY
Start: 2022-11-25 | End: 2024-05-15

## 2024-05-15 RX ORDER — MELOXICAM 15 MG/1
15 TABLET ORAL
Qty: 14 | Refills: 2 | Status: COMPLETED | COMMUNITY
Start: 2023-02-01 | End: 2024-05-15

## 2024-05-15 RX ORDER — ALBUTEROL SULFATE 90 UG/1
108 (90 BASE) INHALANT RESPIRATORY (INHALATION)
Qty: 8 | Refills: 0 | Status: COMPLETED | COMMUNITY
Start: 2022-08-24 | End: 2024-05-15

## 2024-05-15 RX ORDER — FINGOLIMOD HCL 0.5 MG/1
0.5 CAPSULE ORAL
Refills: 0 | Status: COMPLETED | COMMUNITY
End: 2024-05-15

## 2024-05-15 NOTE — COUNSELING
[Nutrition/ Exercise/ Weight Management] : nutrition, exercise, weight management [Body Image] : body image [Vitamins/Supplements] : vitamins/supplements [Sunscreen] : sunscreen [Breast Self Exam] : breast self exam [Bladder Hygiene] : bladder hygiene [FreeTextEntry2] : weight loss stressed

## 2024-05-15 NOTE — HISTORY OF PRESENT ILLNESS
[N] : Patient does not use contraception [Y] : Positive pregnancy history [Yes] : pregnancy [No] : Patient does not have concerns regarding sex [Currently Active] : currently active [Men] : men [TextBox_4] : The patient presents today for a routine GYN exam. She offers no complaints. She notes she has been trying to fill her prescription for Ozempic, but is having difficulty finding a pharmacy that has it. We reviewed together in detail her past medical and surgical histories, allergies and medication usage, social and family history. All questions were answered in easy-to-understand language. [PapSmeardate] : 11/30/2022 [TextBox_78] : no hx [LMPDate] : 5/1/24 [MensesFreq] : 28-30 [MensesLength] : 5-6 [PGHxTotal] : 2 [[facility] Health CenterxFulerm] : 1 [[facility] Medical Centeriving] : 1 [PGHxABSpont] : 1 [TextBox_6] : 5/1/24 [TextBox_9] : 10

## 2024-05-15 NOTE — PHYSICAL EXAM
[Chaperone Present] : A chaperone was present in the examining room during all aspects of the physical examination [92054] : A chaperone was present during the pelvic exam. [Appropriately responsive] : appropriately responsive [Alert] : alert [No Acute Distress] : no acute distress [No Lymphadenopathy] : no lymphadenopathy [Soft] : soft [Non-tender] : non-tender [Non-distended] : non-distended [No HSM] : No HSM [No Lesions] : no lesions [No Mass] : no mass [Oriented x3] : oriented x3 [Examination Of The Breasts] : a normal appearance [No Masses] : no breast masses were palpable [Labia Majora] : normal [Labia Minora] : normal [Uterine Prolapse] : uterine prolapse [Normal] : normal [Uterine Adnexae] : normal [FreeTextEntry2] : Maira Lara [FreeTextEntry3] : This note was written by Maira Lara on 05/15/2024, acting as a scribe for Dr. Omid Pan MD. All medic record entries were at my, Dr. Omid Pan MD, direction and personally dictated by me in 05/15/2024. I have personally reviewed the chart and agree that the record accurately reflects my personal performance of the history, physical exam, assessment, and plan.  [FreeTextEntry6] : small fibroid uterus

## 2024-05-15 NOTE — PLAN
[FreeTextEntry1] : 1)  Self breast exam instructions, mammography discussed with the patient. 2)  Lipid profile assessment, TSH screening, fasting glucose testing, and vitamin D supplementation were discussed with the patient. 3)  Maintain healthy weight. 4)  Regular health maintenance with PCP. 5)  Remain tobacco free. 6)  Limit alcohol intake to less than 5 drinks per week. 7)  Osteoporosis screening and colonoscopy screening. 8)  Annual cholesterol screening. 9)  Annual influenza vaccine. 10) The importance of routine physical activity was reviewed and a goal of 150 minutes of moderately vigorous exercise per week was endorsed.   Yearly breast cancer screening with no current clinical or radiographic concerns. The patient was reminded regarding future well breast and general healthcare, breast cancer risk reduction, the importance of self-examination and the need for follow up. She was again reminded of the need to take Vit D3 2500 IU daily or to keep a Vit D level above 30, and Tumeric 1000 mg daily with Black Pepper. Plan continued yearly imaging and breast follow up, sooner as needed. I counseled the patient on current recommendations to reduce breast cancer risk including but not inclusive to regular exercise 20-30 minutes 3-4 times a week, low fat diet, limiting alcohol consumption, maintenance of ideal body weight, yearly imaging and self-breast awareness. Questions answered. I encouraged in light of COVID-19, social distancing, frequent hand washing and precautions to stay healthy.  Patient is aware of mild uterine prolapse.   I have spent 40 minutes of time on this encounter. Greater than 50% of the face-to-face encounter time was spent on counseling and/or coordination of care for examination findings, differential, testing, management and planning.

## 2024-05-17 LAB
HPV 16 E6+E7 MRNA CVX QL NAA+PROBE: NOT DETECTED
HPV18+45 E6+E7 MRNA CVX QL NAA+PROBE: NOT DETECTED

## 2024-05-21 LAB — CYTOLOGY CVX/VAG DOC THIN PREP: NORMAL

## 2024-07-01 ENCOUNTER — APPOINTMENT (OUTPATIENT)
Dept: OBGYN | Facility: CLINIC | Age: 44
End: 2024-07-01
Payer: COMMERCIAL

## 2024-07-01 VITALS
SYSTOLIC BLOOD PRESSURE: 142 MMHG | OXYGEN SATURATION: 98 % | HEIGHT: 66 IN | WEIGHT: 293 LBS | RESPIRATION RATE: 16 BRPM | HEART RATE: 85 BPM | BODY MASS INDEX: 47.09 KG/M2 | DIASTOLIC BLOOD PRESSURE: 90 MMHG

## 2024-07-01 DIAGNOSIS — N92.0 EXCESSIVE AND FREQUENT MENSTRUATION WITH REGULAR CYCLE: ICD-10-CM

## 2024-07-01 DIAGNOSIS — E66.01 MORBID (SEVERE) OBESITY DUE TO EXCESS CALORIES: ICD-10-CM

## 2024-07-01 PROBLEM — D21.9 FIBROID: Status: ACTIVE | Noted: 2023-09-21

## 2024-07-01 PROBLEM — N93.8 DUB (DYSFUNCTIONAL UTERINE BLEEDING): Status: ACTIVE | Noted: 2024-07-01

## 2024-07-01 PROBLEM — R93.89 THICKENED ENDOMETRIUM: Status: ACTIVE | Noted: 2024-07-01

## 2024-07-01 PROCEDURE — ZZZZZ: CPT

## 2024-07-01 PROCEDURE — 99459 PELVIC EXAMINATION: CPT

## 2024-07-01 PROCEDURE — 99214 OFFICE O/P EST MOD 30 MIN: CPT | Mod: 25

## 2024-07-01 PROCEDURE — 76857 US EXAM PELVIC LIMITED: CPT

## 2024-07-01 PROCEDURE — 76830 TRANSVAGINAL US NON-OB: CPT

## 2024-07-02 RX ORDER — MISOPROSTOL 200 UG/1
200 TABLET ORAL
Qty: 2 | Refills: 0 | Status: ACTIVE | COMMUNITY
Start: 2024-07-02 | End: 1900-01-01

## 2024-07-16 ENCOUNTER — APPOINTMENT (OUTPATIENT)
Dept: OBGYN | Facility: CLINIC | Age: 44
End: 2024-07-16
Payer: COMMERCIAL

## 2024-07-16 ENCOUNTER — APPOINTMENT (OUTPATIENT)
Dept: DERMATOLOGY | Facility: CLINIC | Age: 44
End: 2024-07-16

## 2024-07-16 VITALS
HEIGHT: 66 IN | DIASTOLIC BLOOD PRESSURE: 90 MMHG | BODY MASS INDEX: 47.09 KG/M2 | SYSTOLIC BLOOD PRESSURE: 140 MMHG | WEIGHT: 293 LBS | OXYGEN SATURATION: 98 % | RESPIRATION RATE: 16 BRPM | HEART RATE: 81 BPM

## 2024-07-16 DIAGNOSIS — N93.8 OTHER SPECIFIED ABNORMAL UTERINE AND VAGINAL BLEEDING: ICD-10-CM

## 2024-07-16 DIAGNOSIS — R93.89 ABNORMAL FINDINGS ON DIAGNOSTIC IMAGING OF OTHER SPECIFIED BODY STRUCTURES: ICD-10-CM

## 2024-07-16 DIAGNOSIS — D21.9 BENIGN NEOPLASM OF CONNECTIVE AND OTHER SOFT TISSUE, UNSPECIFIED: ICD-10-CM

## 2024-07-16 LAB
HCG UR QL: NEGATIVE
QUALITY CONTROL: YES

## 2024-07-16 PROCEDURE — 81025 URINE PREGNANCY TEST: CPT

## 2024-07-16 PROCEDURE — 58558Z: CUSTOM

## 2024-07-22 LAB — CORE LAB BIOPSY: NORMAL

## 2024-08-09 ENCOUNTER — OUTPATIENT (OUTPATIENT)
Dept: OUTPATIENT SERVICES | Facility: HOSPITAL | Age: 44
LOS: 1 days | End: 2024-08-09
Payer: COMMERCIAL

## 2024-08-09 VITALS
OXYGEN SATURATION: 98 % | TEMPERATURE: 99 F | HEART RATE: 78 BPM | HEIGHT: 66 IN | RESPIRATION RATE: 16 BRPM | SYSTOLIC BLOOD PRESSURE: 140 MMHG | DIASTOLIC BLOOD PRESSURE: 86 MMHG | WEIGHT: 293 LBS

## 2024-08-09 DIAGNOSIS — Z98.890 OTHER SPECIFIED POSTPROCEDURAL STATES: Chronic | ICD-10-CM

## 2024-08-09 DIAGNOSIS — F41.9 ANXIETY DISORDER, UNSPECIFIED: ICD-10-CM

## 2024-08-09 DIAGNOSIS — J45.909 UNSPECIFIED ASTHMA, UNCOMPLICATED: ICD-10-CM

## 2024-08-09 DIAGNOSIS — N84.0 POLYP OF CORPUS UTERI: ICD-10-CM

## 2024-08-09 DIAGNOSIS — K21.9 GASTRO-ESOPHAGEAL REFLUX DISEASE WITHOUT ESOPHAGITIS: ICD-10-CM

## 2024-08-09 DIAGNOSIS — N93.8 OTHER SPECIFIED ABNORMAL UTERINE AND VAGINAL BLEEDING: ICD-10-CM

## 2024-08-09 DIAGNOSIS — Z01.818 ENCOUNTER FOR OTHER PREPROCEDURAL EXAMINATION: ICD-10-CM

## 2024-08-09 DIAGNOSIS — Z96.22 MYRINGOTOMY TUBE(S) STATUS: Chronic | ICD-10-CM

## 2024-08-09 DIAGNOSIS — D25.9 LEIOMYOMA OF UTERUS, UNSPECIFIED: ICD-10-CM

## 2024-08-09 DIAGNOSIS — Z90.89 ACQUIRED ABSENCE OF OTHER ORGANS: Chronic | ICD-10-CM

## 2024-08-09 DIAGNOSIS — Z98.891 HISTORY OF UTERINE SCAR FROM PREVIOUS SURGERY: Chronic | ICD-10-CM

## 2024-08-09 LAB
ALBUMIN SERPL ELPH-MCNC: 3.8 G/DL — SIGNIFICANT CHANGE UP (ref 3.3–5)
ALP SERPL-CCNC: 54 U/L — SIGNIFICANT CHANGE UP (ref 40–120)
ALT FLD-CCNC: 36 U/L — SIGNIFICANT CHANGE UP (ref 12–78)
ANION GAP SERPL CALC-SCNC: 5 MMOL/L — SIGNIFICANT CHANGE UP (ref 5–17)
APTT BLD: 34.1 SEC — SIGNIFICANT CHANGE UP (ref 24.5–35.6)
AST SERPL-CCNC: 37 U/L — SIGNIFICANT CHANGE UP (ref 15–37)
BASOPHILS # BLD AUTO: 0.07 K/UL — SIGNIFICANT CHANGE UP (ref 0–0.2)
BASOPHILS NFR BLD AUTO: 1.5 % — SIGNIFICANT CHANGE UP (ref 0–2)
BILIRUB SERPL-MCNC: 0.3 MG/DL — SIGNIFICANT CHANGE UP (ref 0.2–1.2)
BUN SERPL-MCNC: 17 MG/DL — SIGNIFICANT CHANGE UP (ref 7–23)
CALCIUM SERPL-MCNC: 9 MG/DL — SIGNIFICANT CHANGE UP (ref 8.5–10.1)
CHLORIDE SERPL-SCNC: 111 MMOL/L — HIGH (ref 96–108)
CO2 SERPL-SCNC: 24 MMOL/L — SIGNIFICANT CHANGE UP (ref 22–31)
CREAT SERPL-MCNC: 0.89 MG/DL — SIGNIFICANT CHANGE UP (ref 0.5–1.3)
EGFR: 83 ML/MIN/1.73M2 — SIGNIFICANT CHANGE UP
EOSINOPHIL # BLD AUTO: 0.15 K/UL — SIGNIFICANT CHANGE UP (ref 0–0.5)
EOSINOPHIL NFR BLD AUTO: 3.3 % — SIGNIFICANT CHANGE UP (ref 0–6)
GLUCOSE SERPL-MCNC: 110 MG/DL — HIGH (ref 70–99)
HCG SERPL-ACNC: <1 MIU/ML — SIGNIFICANT CHANGE UP
HCT VFR BLD CALC: 36.2 % — SIGNIFICANT CHANGE UP (ref 34.5–45)
HGB BLD-MCNC: 12.5 G/DL — SIGNIFICANT CHANGE UP (ref 11.5–15.5)
IMM GRANULOCYTES NFR BLD AUTO: 0.9 % — SIGNIFICANT CHANGE UP (ref 0–0.9)
INR BLD: 1.02 RATIO — SIGNIFICANT CHANGE UP (ref 0.85–1.18)
LYMPHOCYTES # BLD AUTO: 1.5 K/UL — SIGNIFICANT CHANGE UP (ref 1–3.3)
LYMPHOCYTES # BLD AUTO: 32.6 % — SIGNIFICANT CHANGE UP (ref 13–44)
MCHC RBC-ENTMCNC: 29.6 PG — SIGNIFICANT CHANGE UP (ref 27–34)
MCHC RBC-ENTMCNC: 34.5 GM/DL — SIGNIFICANT CHANGE UP (ref 32–36)
MCV RBC AUTO: 85.8 FL — SIGNIFICANT CHANGE UP (ref 80–100)
MONOCYTES # BLD AUTO: 0.48 K/UL — SIGNIFICANT CHANGE UP (ref 0–0.9)
MONOCYTES NFR BLD AUTO: 10.4 % — SIGNIFICANT CHANGE UP (ref 2–14)
NEUTROPHILS # BLD AUTO: 2.36 K/UL — SIGNIFICANT CHANGE UP (ref 1.8–7.4)
NEUTROPHILS NFR BLD AUTO: 51.3 % — SIGNIFICANT CHANGE UP (ref 43–77)
NRBC # BLD: 0 /100 WBCS — SIGNIFICANT CHANGE UP (ref 0–0)
PLATELET # BLD AUTO: 284 K/UL — SIGNIFICANT CHANGE UP (ref 150–400)
POTASSIUM SERPL-MCNC: 4 MMOL/L — SIGNIFICANT CHANGE UP (ref 3.5–5.3)
POTASSIUM SERPL-SCNC: 4 MMOL/L — SIGNIFICANT CHANGE UP (ref 3.5–5.3)
PROT SERPL-MCNC: 7.2 G/DL — SIGNIFICANT CHANGE UP (ref 6–8.3)
PROTHROM AB SERPL-ACNC: 11.6 SEC — SIGNIFICANT CHANGE UP (ref 9.5–13)
RBC # BLD: 4.22 M/UL — SIGNIFICANT CHANGE UP (ref 3.8–5.2)
RBC # FLD: 12.4 % — SIGNIFICANT CHANGE UP (ref 10.3–14.5)
SODIUM SERPL-SCNC: 140 MMOL/L — SIGNIFICANT CHANGE UP (ref 135–145)
WBC # BLD: 4.6 K/UL — SIGNIFICANT CHANGE UP (ref 3.8–10.5)
WBC # FLD AUTO: 4.6 K/UL — SIGNIFICANT CHANGE UP (ref 3.8–10.5)

## 2024-08-09 PROCEDURE — 93005 ELECTROCARDIOGRAM TRACING: CPT

## 2024-08-09 PROCEDURE — 85025 COMPLETE CBC W/AUTO DIFF WBC: CPT

## 2024-08-09 PROCEDURE — 86850 RBC ANTIBODY SCREEN: CPT

## 2024-08-09 PROCEDURE — 85610 PROTHROMBIN TIME: CPT

## 2024-08-09 PROCEDURE — 84702 CHORIONIC GONADOTROPIN TEST: CPT

## 2024-08-09 PROCEDURE — 93010 ELECTROCARDIOGRAM REPORT: CPT

## 2024-08-09 PROCEDURE — 80053 COMPREHEN METABOLIC PANEL: CPT

## 2024-08-09 PROCEDURE — 85730 THROMBOPLASTIN TIME PARTIAL: CPT

## 2024-08-09 PROCEDURE — G0463: CPT

## 2024-08-09 PROCEDURE — 86901 BLOOD TYPING SEROLOGIC RH(D): CPT

## 2024-08-09 PROCEDURE — 36415 COLL VENOUS BLD VENIPUNCTURE: CPT

## 2024-08-09 PROCEDURE — 86900 BLOOD TYPING SEROLOGIC ABO: CPT

## 2024-08-09 NOTE — H&P PST ADULT - HISTORY OF PRESENT ILLNESS
43 year old female , LMP8/ PMH Hypercholesterolemia, Multiple Sclerosis (DX 2007), DVT/PE (2012),Anxiety/Depression, Migraines, Acid Reflux, Asthma, Hay Fever, Arthritis,  Leiomyoma of Uterus c/o vaginal Bleeding since 2022 s/p hysteroscopy W/Myosure with Dr Omid Pan on 3/2/2023.  Pt c/o intermittent heavy periods x one year & ruptured ovarian cyst in 2024- f/u sonogram revealed endometrial thickening. Pt scheduled for Hysteroscopy w/Myosure on 24.  **Denies any fever, chills, CP/SOB or sick contacts     43 year old female , LMP8/ PMH Hypercholesterolemia, Multiple Sclerosis (DX 2007), DVT/PE (2012 , no AC @ present, hematology evaluation WNL),Anxiety/Depression, Migraines, Acid Reflux, Asthma, Hay Fever, Arthritis,  Leiomyoma of Uterus c/o vaginal Bleeding since 2022 s/p hysteroscopy W/Myosure with Dr Oimd Pan on 3/2/2023.  Pt c/o intermittent heavy periods x one year & ruptured ovarian cyst in 2024- f/u sonogram revealed endometrial thickening. Pt scheduled for Hysteroscopy w/Myosure on 24.  **Denies any fever, chills, CP/SOB or sick contacts  **Pt was on Wegovy for wt loss, last dose in

## 2024-08-09 NOTE — H&P PST ADULT - RESPIRATORY
clear to auscultation bilaterally/no rales/airway patent/breath sounds equal/good air movement/respirations non-labored

## 2024-08-09 NOTE — H&P PST ADULT - NSICDXPASTMEDICALHX_GEN_ALL_CORE_FT
PAST MEDICAL HISTORY:  Abnormal uterine and vaginal bleeding, unspecified     Acid reflux     Anxiety with depression     Arthritis     Asthma     Deep vein thrombosis (DVT) of lower extremity     Hay fever     Hypercholesterolemia     Leiomyoma of uterus, unspecified     Migraines     Multiple sclerosis     Obesity, unspecified     Pulmonary embolism     Severe obesity (BMI >= 40)

## 2024-08-09 NOTE — H&P PST ADULT - PROBLEM SELECTOR PLAN 1
Hysteroscopy w/myosure device on 8/16/24  Labs- CBC, CMP, HCG, PT/PTT, T&S, EKG  Pre op instructions discussed, verbalized understanding  Pre op PCP evaluation

## 2024-08-15 ENCOUNTER — TRANSCRIPTION ENCOUNTER (OUTPATIENT)
Age: 44
End: 2024-08-15

## 2024-08-16 ENCOUNTER — TRANSCRIPTION ENCOUNTER (OUTPATIENT)
Age: 44
End: 2024-08-16

## 2024-08-16 ENCOUNTER — APPOINTMENT (OUTPATIENT)
Dept: OBGYN | Facility: HOSPITAL | Age: 44
End: 2024-08-16

## 2024-08-16 ENCOUNTER — RESULT REVIEW (OUTPATIENT)
Age: 44
End: 2024-08-16

## 2024-08-16 RX ORDER — FLUOXETINE HCL 10 MG
1 CAPSULE ORAL
Refills: 0 | DISCHARGE

## 2024-08-16 RX ORDER — DESVENLAFAXINE 50 MG/1
1 TABLET, EXTENDED RELEASE ORAL
Refills: 0 | DISCHARGE

## 2024-08-16 RX ORDER — OCRELIZUMAB 300 MG/10ML
600 INJECTION INTRAVENOUS
Refills: 0 | DISCHARGE

## 2024-11-19 ENCOUNTER — APPOINTMENT (OUTPATIENT)
Dept: OBGYN | Facility: CLINIC | Age: 44
End: 2024-11-19
Payer: COMMERCIAL

## 2024-11-19 VITALS
HEART RATE: 102 BPM | OXYGEN SATURATION: 98 % | DIASTOLIC BLOOD PRESSURE: 88 MMHG | RESPIRATION RATE: 14 BRPM | BODY MASS INDEX: 47.09 KG/M2 | WEIGHT: 293 LBS | HEIGHT: 66 IN | SYSTOLIC BLOOD PRESSURE: 132 MMHG

## 2024-11-19 DIAGNOSIS — Z01.411 ENCOUNTER FOR GYNECOLOGICAL EXAMINATION (GENERAL) (ROUTINE) WITH ABNORMAL FINDINGS: ICD-10-CM

## 2024-11-19 DIAGNOSIS — N80.03 ADENOMYOSIS OF THE UTERUS: ICD-10-CM

## 2024-11-19 DIAGNOSIS — G35 MULTIPLE SCLEROSIS: ICD-10-CM

## 2024-11-19 DIAGNOSIS — N93.8 OTHER SPECIFIED ABNORMAL UTERINE AND VAGINAL BLEEDING: ICD-10-CM

## 2024-11-19 DIAGNOSIS — N92.0 EXCESSIVE AND FREQUENT MENSTRUATION WITH REGULAR CYCLE: ICD-10-CM

## 2024-11-19 DIAGNOSIS — Z86.718 PERSONAL HISTORY OF OTHER VENOUS THROMBOSIS AND EMBOLISM: ICD-10-CM

## 2024-11-19 DIAGNOSIS — E66.01 MORBID (SEVERE) OBESITY DUE TO EXCESS CALORIES: ICD-10-CM

## 2024-11-19 DIAGNOSIS — R93.89 ABNORMAL FINDINGS ON DIAGNOSTIC IMAGING OF OTHER SPECIFIED BODY STRUCTURES: ICD-10-CM

## 2024-11-19 DIAGNOSIS — D21.9 BENIGN NEOPLASM OF CONNECTIVE AND OTHER SOFT TISSUE, UNSPECIFIED: ICD-10-CM

## 2024-11-19 DIAGNOSIS — Z86.711 PERSONAL HISTORY OF PULMONARY EMBOLISM: ICD-10-CM

## 2024-11-19 PROCEDURE — 99459 PELVIC EXAMINATION: CPT

## 2024-11-19 PROCEDURE — ZZZZZ: CPT

## 2024-11-19 PROCEDURE — 99214 OFFICE O/P EST MOD 30 MIN: CPT | Mod: 25

## 2024-11-19 PROCEDURE — 36415 COLL VENOUS BLD VENIPUNCTURE: CPT

## 2024-11-19 PROCEDURE — 76857 US EXAM PELVIC LIMITED: CPT

## 2024-11-19 PROCEDURE — 76830 TRANSVAGINAL US NON-OB: CPT

## 2024-11-20 RX ORDER — TRANEXAMIC ACID 650 MG/1
650 TABLET ORAL
Qty: 30 | Refills: 5 | Status: ACTIVE | COMMUNITY
Start: 2024-11-19 | End: 1900-01-01

## 2025-06-05 NOTE — DISCUSSION/SUMMARY
[de-identified] : Today I had a lengthy discussion with the patient regarding their bilateral pain.I have addressed all the patient's concerns surrounding the pathology of their condition. I advised the patient to utilize a wide toed cross training sneaker that better accommodates the feet. I recommended New Balance, Marie, or [place] to the patient. I have discussed that light exercise, such as walking, swimming, elliptical and cycling along with weight loss will help alleviate her symptoms. I recommend that the patient utilize ice, NSAIDS PRN, and heat. The patient understood and verbally agreed to the treatment plan. All of their questions were answered and they were satisfied with the visit. The patient should call the office if they have any questions or experience worsening symptoms.\par \par I would like to see the patient back in the office in PRN to reassess their condition.\par 
(1) Other Medications/None

## 2025-07-14 ENCOUNTER — APPOINTMENT (OUTPATIENT)
Dept: OBGYN | Facility: CLINIC | Age: 45
End: 2025-07-14
Payer: COMMERCIAL

## 2025-07-14 VITALS
HEART RATE: 92 BPM | OXYGEN SATURATION: 98 % | RESPIRATION RATE: 14 BRPM | BODY MASS INDEX: 47.09 KG/M2 | WEIGHT: 293 LBS | HEIGHT: 66 IN

## 2025-07-14 LAB
HCG UR QL: NEGATIVE
QUALITY CONTROL: YES

## 2025-07-14 PROCEDURE — 81025 URINE PREGNANCY TEST: CPT

## 2025-07-14 PROCEDURE — 99396 PREV VISIT EST AGE 40-64: CPT

## 2025-07-14 RX ORDER — SEMAGLUTIDE 1 MG/.5ML
1 INJECTION, SOLUTION SUBCUTANEOUS
Qty: 1 | Refills: 0 | Status: ACTIVE | COMMUNITY
Start: 2025-07-14

## 2025-07-16 LAB — HPV HIGH+LOW RISK DNA PNL CVX: NOT DETECTED

## 2025-07-18 LAB — CYTOLOGY CVX/VAG DOC THIN PREP: NORMAL

## (undated) DEVICE — VENODYNE/SCD SLEEVE CALF LARGE

## (undated) DEVICE — FLUENT FMS PROCEDURE KIT

## (undated) DEVICE — MYOSURE SCOPE SEAL

## (undated) DEVICE — PACK LITHOTOMY

## (undated) DEVICE — PREP TRAY DRY SKIN PREP SCRUB

## (undated) DEVICE — SOL IRR BAG NS 0.9% 3000ML

## (undated) DEVICE — DRAPE MAYO STAND 23"

## (undated) DEVICE — VENODYNE/SCD SLEEVE CALF MEDIUM

## (undated) DEVICE — GLV 7 PROTEXIS (WHITE)

## (undated) DEVICE — PLV-SCD MACHINE: Type: DURABLE MEDICAL EQUIPMENT

## (undated) DEVICE — WARMING BLANKET UPPER ADULT

## (undated) DEVICE — DRAPE TOWEL BLUE 17" X 24"

## (undated) DEVICE — DRAPE LIGHT HANDLE COVER (GREEN)